# Patient Record
Sex: FEMALE | Race: WHITE | HISPANIC OR LATINO | Employment: STUDENT | URBAN - METROPOLITAN AREA
[De-identification: names, ages, dates, MRNs, and addresses within clinical notes are randomized per-mention and may not be internally consistent; named-entity substitution may affect disease eponyms.]

---

## 2017-01-05 ENCOUNTER — OFFICE VISIT (OUTPATIENT)
Dept: BEHAVIORAL HEALTH | Facility: CLINIC | Age: 22
End: 2017-01-05
Payer: COMMERCIAL

## 2017-01-05 ENCOUNTER — OFFICE VISIT (OUTPATIENT)
Dept: FAMILY MEDICINE | Facility: CLINIC | Age: 22
End: 2017-01-05
Payer: COMMERCIAL

## 2017-01-05 VITALS
SYSTOLIC BLOOD PRESSURE: 110 MMHG | HEART RATE: 82 BPM | DIASTOLIC BLOOD PRESSURE: 77 MMHG | TEMPERATURE: 98.8 F | BODY MASS INDEX: 27.15 KG/M2 | WEIGHT: 173 LBS | HEIGHT: 67 IN

## 2017-01-05 DIAGNOSIS — Z30.8 ENCOUNTER FOR OTHER CONTRACEPTIVE MANAGEMENT: ICD-10-CM

## 2017-01-05 DIAGNOSIS — N76.0 BV (BACTERIAL VAGINOSIS): Primary | ICD-10-CM

## 2017-01-05 DIAGNOSIS — B96.89 BV (BACTERIAL VAGINOSIS): Primary | ICD-10-CM

## 2017-01-05 DIAGNOSIS — Z72.51 UNPROTECTED SEX: ICD-10-CM

## 2017-01-05 DIAGNOSIS — F43.23 ADJUSTMENT DISORDER WITH MIXED ANXIETY AND DEPRESSED MOOD: Primary | ICD-10-CM

## 2017-01-05 DIAGNOSIS — N89.8 VAGINAL DISCHARGE: ICD-10-CM

## 2017-01-05 LAB
MICRO REPORT STATUS: ABNORMAL
SPECIMEN SOURCE: ABNORMAL
WET PREP SPEC: ABNORMAL

## 2017-01-05 PROCEDURE — 99000 SPECIMEN HANDLING OFFICE-LAB: CPT | Performed by: NURSE PRACTITIONER

## 2017-01-05 PROCEDURE — 87491 CHLMYD TRACH DNA AMP PROBE: CPT | Mod: 90 | Performed by: NURSE PRACTITIONER

## 2017-01-05 PROCEDURE — 90832 PSYTX W PT 30 MINUTES: CPT | Performed by: SOCIAL WORKER

## 2017-01-05 PROCEDURE — 87210 SMEAR WET MOUNT SALINE/INK: CPT | Performed by: NURSE PRACTITIONER

## 2017-01-05 PROCEDURE — 87591 N.GONORRHOEAE DNA AMP PROB: CPT | Mod: 90 | Performed by: NURSE PRACTITIONER

## 2017-01-05 PROCEDURE — 99214 OFFICE O/P EST MOD 30 MIN: CPT | Performed by: NURSE PRACTITIONER

## 2017-01-05 RX ORDER — METRONIDAZOLE 500 MG/1
500 TABLET ORAL 2 TIMES DAILY
Qty: 14 TABLET | Refills: 0 | Status: SHIPPED | OUTPATIENT
Start: 2017-01-05 | End: 2017-03-16

## 2017-01-05 RX ORDER — LEVONORGESTREL 1.5 MG/1
1.5 TABLET ORAL ONCE
Qty: 1 TABLET | Refills: 0 | Status: SHIPPED | OUTPATIENT
Start: 2017-01-05 | End: 2017-03-16

## 2017-01-05 RX ORDER — ACETAMINOPHEN AND CODEINE PHOSPHATE 120; 12 MG/5ML; MG/5ML
1 SOLUTION ORAL DAILY
Qty: 84 TABLET | Refills: 3 | Status: SHIPPED | OUTPATIENT
Start: 2017-01-05 | End: 2018-01-08

## 2017-01-05 ASSESSMENT — ANXIETY QUESTIONNAIRES
3. WORRYING TOO MUCH ABOUT DIFFERENT THINGS: SEVERAL DAYS
6. BECOMING EASILY ANNOYED OR IRRITABLE: NEARLY EVERY DAY
IF YOU CHECKED OFF ANY PROBLEMS ON THIS QUESTIONNAIRE, HOW DIFFICULT HAVE THESE PROBLEMS MADE IT FOR YOU TO DO YOUR WORK, TAKE CARE OF THINGS AT HOME, OR GET ALONG WITH OTHER PEOPLE: SOMEWHAT DIFFICULT
1. FEELING NERVOUS, ANXIOUS, OR ON EDGE: SEVERAL DAYS
7. FEELING AFRAID AS IF SOMETHING AWFUL MIGHT HAPPEN: SEVERAL DAYS
2. NOT BEING ABLE TO STOP OR CONTROL WORRYING: SEVERAL DAYS
GAD7 TOTAL SCORE: 11
5. BEING SO RESTLESS THAT IT IS HARD TO SIT STILL: MORE THAN HALF THE DAYS

## 2017-01-05 ASSESSMENT — PATIENT HEALTH QUESTIONNAIRE - PHQ9: 5. POOR APPETITE OR OVEREATING: MORE THAN HALF THE DAYS

## 2017-01-05 NOTE — NURSING NOTE
"Chief Complaint   Patient presents with     Vaginal Problem     yeast infection     Contraception     discuss emergency contraception       Initial /77 mmHg  Pulse 82  Temp(Src) 98.8  F (37.1  C) (Oral)  Ht 5' 6.5\" (1.689 m)  Wt 173 lb (78.472 kg)  BMI 27.51 kg/m2 Estimated body mass index is 27.51 kg/(m^2) as calculated from the following:    Height as of this encounter: 5' 6.5\" (1.689 m).    Weight as of this encounter: 173 lb (78.472 kg).  BP completed using cuff size: regular  "

## 2017-01-05 NOTE — PROGRESS NOTES
"  SUBJECTIVE:                                                    Anny Lisa is a 21 year old female who presents to clinic today for the following health issues:    Chief Complaint   Patient presents with     Vaginal Problem     yeast infection     Contraception- discuss     emergency contraception  Had unprotected intercourse.         Vaginal Symptoms     Onset:  Ongoing issue since August;     Treated for BV in the past    States it turned into a yeast infection    Treated herself with three day OTC medication for yeast which did not relieve symptoms    Description:  Vaginal Discharge: white   Itching (Pruritis): YES  Burning sensation:  YES  Odor: YES    Accompanying Signs & Symptoms:  Pain with Urination: no   Abdominal Pain: no   Fever: no    History:   Sexually active: YES  New Partner: YES  Possibility of Pregnancy:  Yes    Precipitating factors:   Recent Antibiotic Use: no     Alleviating factors:  none     Therapies Tried and outcome: otc yeast treatment, no relief            Problem list and histories reviewed & adjusted, as indicated.  Additional history: as documented    Problem list, Medication list, Allergies, and Medical/Social/Surgical histories reviewed in [facility] and updated as appropriate.    ROS:  Constitutional, HEENT, cardiovascular, pulmonary, gi and gu systems are negative, except as otherwise noted.    OBJECTIVE:                                                    /77 mmHg  Pulse 82  Temp(Src) 98.8  F (37.1  C) (Oral)  Ht 5' 6.5\" (1.689 m)  Wt 173 lb (78.472 kg)  BMI 27.51 kg/m2  Body mass index is 27.51 kg/(m^2).  GENERAL: healthy, alert and no distress   (female): normal female external genitalia, normal urethral meatus, vaginal mucosa, normal cervix/adnexa/uterus without masses or discharge    Diagnostic Test Results:  Results for orders placed or performed in visit on 01/05/17 (from the past 24 hour(s))   Wet prep   Result Value Ref Range    Specimen Description Vagina     " Wet Prep (A)      No yeast seen  Clue cells seen Moderate  No Trichomonas seen      Micro Report Status FINAL 01/05/2017         ASSESSMENT/PLAN:                                                        ICD-10-CM    1. BV (bacterial vaginosis) N76.0 metroNIDAZOLE (FLAGYL) 500 MG tablet    B96.89    2. Encounter for other contraceptive management Z30.8 norethindrone (MICRONOR) 0.35 MG per tablet   3. Unprotected sex Z72.51 levonorgestrel (PLAN B) 1.5 MG TABS tablet   4. Vaginal discharge N89.8 Wet prep     NEISSERIA GONORRHOEA PCR     CHLAMYDIA TRACHOMATIS PCR     Patient was teary today - seemed sad.  Didn't want to share details with me.  Willing to talk to counselor today - warm hand off to our [facility].      The risks, benefits and treatment options of prescribed medications or other treatments have been discussed with the patient. The patient verbalized their understanding and should call or follow up if no improvement or if they develop further problems.  JAMIE Sandra [facility]
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
## 2017-01-06 LAB
C TRACH DNA SPEC QL NAA+PROBE: NORMAL
N GONORRHOEA DNA SPEC QL NAA+PROBE: NORMAL
SPECIMEN SOURCE: NORMAL
SPECIMEN SOURCE: NORMAL

## 2017-01-06 ASSESSMENT — PATIENT HEALTH QUESTIONNAIRE - PHQ9: SUM OF ALL RESPONSES TO PHQ QUESTIONS 1-9: 7

## 2017-01-06 ASSESSMENT — ANXIETY QUESTIONNAIRES: GAD7 TOTAL SCORE: 11

## 2017-01-11 ENCOUNTER — APPOINTMENT (OUTPATIENT)
Dept: ULTRASOUND IMAGING | Facility: CLINIC | Age: 22
End: 2017-01-11
Attending: EMERGENCY MEDICINE
Payer: COMMERCIAL

## 2017-01-11 ENCOUNTER — HOSPITAL ENCOUNTER (EMERGENCY)
Facility: CLINIC | Age: 22
Discharge: HOME | End: 2017-01-11
Attending: EMERGENCY MEDICINE | Admitting: EMERGENCY MEDICINE
Payer: COMMERCIAL

## 2017-01-11 VITALS
TEMPERATURE: 98.2 F | SYSTOLIC BLOOD PRESSURE: 128 MMHG | OXYGEN SATURATION: 97 % | HEIGHT: 67 IN | BODY MASS INDEX: 27.15 KG/M2 | HEART RATE: 74 BPM | RESPIRATION RATE: 18 BRPM | WEIGHT: 173 LBS | DIASTOLIC BLOOD PRESSURE: 79 MMHG

## 2017-01-11 DIAGNOSIS — M79.662 PAIN OF LEFT LOWER LEG: ICD-10-CM

## 2017-01-11 DIAGNOSIS — D68.51 FACTOR 5 LEIDEN MUTATION, HETEROZYGOUS (H): ICD-10-CM

## 2017-01-11 LAB
ALBUMIN SERPL-MCNC: 3.8 G/DL (ref 3.4–5)
ALP SERPL-CCNC: 56 U/L (ref 40–150)
ALT SERPL W P-5'-P-CCNC: 27 U/L (ref 0–50)
ANION GAP SERPL CALCULATED.3IONS-SCNC: 4 MMOL/L (ref 3–14)
AST SERPL W P-5'-P-CCNC: 16 U/L (ref 0–45)
BASOPHILS # BLD AUTO: 0 10E9/L (ref 0–0.2)
BASOPHILS NFR BLD AUTO: 0.6 %
BILIRUB SERPL-MCNC: 0.4 MG/DL (ref 0.2–1.3)
BUN SERPL-MCNC: 11 MG/DL (ref 7–30)
CALCIUM SERPL-MCNC: 8.5 MG/DL (ref 8.5–10.1)
CHLORIDE SERPL-SCNC: 109 MMOL/L (ref 94–109)
CO2 SERPL-SCNC: 29 MMOL/L (ref 20–32)
CREAT SERPL-MCNC: 0.81 MG/DL (ref 0.52–1.04)
D DIMER PPP FEU-MCNC: NORMAL UG/ML FEU (ref 0–0.5)
DIFFERENTIAL METHOD BLD: NORMAL
EOSINOPHIL # BLD AUTO: 0.2 10E9/L (ref 0–0.7)
EOSINOPHIL NFR BLD AUTO: 2.5 %
ERYTHROCYTE [DISTWIDTH] IN BLOOD BY AUTOMATED COUNT: 12.6 % (ref 10–15)
GFR SERPL CREATININE-BSD FRML MDRD: 87 ML/MIN/1.7M2
GLUCOSE SERPL-MCNC: 78 MG/DL (ref 70–99)
HCT VFR BLD AUTO: 43.8 % (ref 35–47)
HGB BLD-MCNC: 14.8 G/DL (ref 11.7–15.7)
IMM GRANULOCYTES # BLD: 0 10E9/L (ref 0–0.4)
IMM GRANULOCYTES NFR BLD: 0.2 %
LYMPHOCYTES # BLD AUTO: 1.9 10E9/L (ref 0.8–5.3)
LYMPHOCYTES NFR BLD AUTO: 30.7 %
MCH RBC QN AUTO: 30.8 PG (ref 26.5–33)
MCHC RBC AUTO-ENTMCNC: 33.8 G/DL (ref 31.5–36.5)
MCV RBC AUTO: 91 FL (ref 78–100)
MONOCYTES # BLD AUTO: 0.6 10E9/L (ref 0–1.3)
MONOCYTES NFR BLD AUTO: 9.9 %
NEUTROPHILS # BLD AUTO: 3.5 10E9/L (ref 1.6–8.3)
NEUTROPHILS NFR BLD AUTO: 56.1 %
PLATELET # BLD AUTO: 241 10E9/L (ref 150–450)
POTASSIUM SERPL-SCNC: 4.2 MMOL/L (ref 3.4–5.3)
PROT SERPL-MCNC: 7.3 G/DL (ref 6.8–8.8)
RBC # BLD AUTO: 4.81 10E12/L (ref 3.8–5.2)
SODIUM SERPL-SCNC: 142 MMOL/L (ref 133–144)
WBC # BLD AUTO: 6.3 10E9/L (ref 4–11)

## 2017-01-11 PROCEDURE — 85025 COMPLETE CBC W/AUTO DIFF WBC: CPT | Performed by: EMERGENCY MEDICINE

## 2017-01-11 PROCEDURE — 99285 EMERGENCY DEPT VISIT HI MDM: CPT | Mod: 25

## 2017-01-11 PROCEDURE — 93005 ELECTROCARDIOGRAM TRACING: CPT

## 2017-01-11 PROCEDURE — 85379 FIBRIN DEGRADATION QUANT: CPT | Performed by: EMERGENCY MEDICINE

## 2017-01-11 PROCEDURE — 99284 EMERGENCY DEPT VISIT MOD MDM: CPT | Performed by: EMERGENCY MEDICINE

## 2017-01-11 PROCEDURE — 93971 EXTREMITY STUDY: CPT | Mod: LT

## 2017-01-11 PROCEDURE — 80053 COMPREHEN METABOLIC PANEL: CPT | Performed by: EMERGENCY MEDICINE

## 2017-01-11 ASSESSMENT — ENCOUNTER SYMPTOMS
ENDOCRINE NEGATIVE: 1
CONSTITUTIONAL NEGATIVE: 1
CARDIOVASCULAR NEGATIVE: 1
EYES NEGATIVE: 1
GASTROINTESTINAL NEGATIVE: 1
PSYCHIATRIC NEGATIVE: 1
RESPIRATORY NEGATIVE: 1
HEMATOLOGIC/LYMPHATIC NEGATIVE: 1

## 2017-01-11 NOTE — ED AVS SNAPSHOT
[facility] Emergency Department    [address] [address]    Phone:  [phone]    Fax:  [phone]                                       Anny Lisa   MRN: [id number]    Department:  [facility] Emergency Department   Date of Visit:  1/11/2017           Patient Information     Date Of Birth          1995        Your diagnoses for this visit were:     Pain of left lower leg overlying popliteal fossa    Factor 5 Leiden mutation, heterozygous (H)        You were seen by Trent Mars MD.      Follow-up Information     Follow up with Roxy Ordoñez APRN CNP. Schedule an appointment as soon as possible for a visit in 3 days.    Specialty:  Nurse Practitioner - Adult Health    Why:  As needed, If symptoms worsen    Contact information:    [facility]  [address] [address]  [phone]        Discharge References/Attachments     SYMPTOMS WITH UNCERTAIN CAUSE (ENGLISH)      24 Hour Appointment Hotline       To make an appointment at any [facility]3], call [phone] ([phone]). If you don't have a family doctor or clinic, we will help you find one. [facility] clinics are conveniently located to serve the needs of you and your family.             Review of your medicines      Our records show that you are taking the medicines listed below. If these are incorrect, please call your family doctor or clinic.        Dose / Directions Last dose taken    ALEVE PO   Dose:  220 mg        Take 220 mg by mouth daily as needed for moderate pain   Refills:  0        levonorgestrel 1.5 MG Tabs tablet   Commonly known as:  PLAN B   Dose:  1.5 mg   Quantity:  1 tablet        Take 1 tablet (1.5 mg) by mouth once for 1 dose   Refills:  0        metroNIDAZOLE 500 MG tablet   Commonly known as:  FLAGYL   Dose:  500 mg   Quantity:  14 tablet        Take 1 tablet (500 mg) by mouth 2 times daily   Refills:  0        norethindrone 0.35 MG per  tablet   Commonly known as:  MICRONOR   Dose:  1 tablet   Quantity:  84 tablet        Take 1 tablet (0.35 mg) by mouth daily   Refills:  3                Procedures and tests performed during your visit     CBC with platelets differential    Comprehensive metabolic panel    D dimer quantitative    EKG 12-lead, tracing only    US Lower Extremity Venous Duplex Left      Orders Needing Specimen Collection     None      Pending Results     Date and Time Order Name Status Description    1/11/2017 1414 US Lower Extremity Venous Duplex Left Preliminary             Pending Culture Results     No orders found from 1/10/2017 to 1/12/2017.       Test Results from your hospital stay           1/11/2017  1:20 PM - Interface, Flexilab Results      Component Results     Component Value Ref Range & Units Status    WBC 6.3 4.0 - 11.0 10e9/L Final    RBC Count 4.81 3.8 - 5.2 10e12/L Final    Hemoglobin 14.8 11.7 - 15.7 g/dL Final    Hematocrit 43.8 35.0 - 47.0 % Final    MCV 91 78 - 100 fl Final    MCH 30.8 26.5 - 33.0 pg Final    MCHC 33.8 31.5 - 36.5 g/dL Final    RDW 12.6 10.0 - 15.0 % Final    Platelet Count 241 150 - 450 10e9/L Final    Diff Method Automated Method  Final    % Neutrophils 56.1 % Final    % Lymphocytes 30.7 % Final    % Monocytes 9.9 % Final    % Eosinophils 2.5 % Final    % Basophils 0.6 % Final    % Immature Granulocytes 0.2 % Final    Absolute Neutrophil 3.5 1.6 - 8.3 10e9/L Final    Absolute Lymphocytes 1.9 0.8 - 5.3 10e9/L Final    Absolute Monocytes 0.6 0.0 - 1.3 10e9/L Final    Absolute Eosinophils 0.2 0.0 - 0.7 10e9/L Final    Absolute Basophils 0.0 0.0 - 0.2 10e9/L Final    Abs Immature Granulocytes 0.0 0 - 0.4 10e9/L Final         1/11/2017  1:34 PM - Interface, Flexilab Results      Component Results     Component Value Ref Range & Units Status    Sodium 142 133 - 144 mmol/L Final    Potassium 4.2 3.4 - 5.3 mmol/L Final    Chloride 109 94 - 109 mmol/L Final    Carbon Dioxide 29 20 - 32 mmol/L Final     Anion Gap 4 3 - 14 mmol/L Final    Glucose 78 70 - 99 mg/dL Final    Urea Nitrogen 11 7 - 30 mg/dL Final    Creatinine 0.81 0.52 - 1.04 mg/dL Final    GFR Estimate 87 >60 mL/min/1.7m2 Final    Non  GFR Calc    GFR Estimate If Black >90   GFR Calc   >60 mL/min/1.7m2 Final    Calcium 8.5 8.5 - 10.1 mg/dL Final    Bilirubin Total 0.4 0.2 - 1.3 mg/dL Final    Albumin 3.8 3.4 - 5.0 g/dL Final    Protein Total 7.3 6.8 - 8.8 g/dL Final    Alkaline Phosphatase 56 40 - 150 U/L Final    ALT 27 0 - 50 U/L Final    AST 16 0 - 45 U/L Final         1/11/2017  3:13 PM - Interface, Radiant Ib      Narrative     VENOUS ULTRASOUND LEFT LOWER EXTREMITY  1/11/2017 2:58 PM     HISTORY: History of Factor-V Leiden, left popliteal pain. History of  deep vein thrombosis 8 years ago. Evaluate for deep vein thrombosis.      COMPARISON: 4/15/2011.    FINDINGS: Examination of the deep veins with graded compression and  color flow Doppler with spectral wave form analysis shows no evidence  of thrombus in the common femoral vein, femoral vein, popliteal vein  or calf veins.  There is no venous insufficiency.        Impression     IMPRESSION: No evidence of deep venous thrombosis.         1/11/2017  2:43 PM - Interface, Flexilab Results      Component Results     Component Value Ref Range & Units Status    D Dimer  0.0 - 0.50 ug/ml FEU Final    <0.3  This D-dimer assay is intended for use in conjuntion with a clinical pretest   probability assessment model to exclude pulmonary embolism (PE) and as an aid   in the diagnosis of deep venous thrombosis (DVT) in outpatients suspected of PE   or DVT. The cut-off value is 0.5 g/mL FEU.                  Thank you for choosing [facility]       Thank you for choosing [facility] for your care. Our goal is always to provide you with excellent care. Hearing back from our patients is one way we can continue to improve our services. Please take a few minutes to complete the written  "survey that you may receive in the mail after you visit with us. Thank you!        [employer] Information     [employer] lets you send messages to your doctor, view your test results, renew your prescriptions, schedule appointments and more. To sign up, go to [email or web address] . Click on \"Log in\" on the left side of the screen, which will take you to the Welcome page. Then click on \"Sign up Now\" on the right side of the page.     You will be asked to enter the access code listed below, as well as some personal information. Please follow the directions to create your username and password.     Your access code is: [email or web address]  Expires: 2017  3:34 PM     Your access code will  in 90 days. If you need help or a new code, please call your [facility] clinic or [phone].        Care EveryWhere ID     This is your Care EveryWhere ID. This could be used by other organizations to access your [facility] medical records  [id number]        After Visit Summary       This is your record. Keep this with you and show to your community pharmacist(s) and doctor(s) at your next visit.                  "

## 2017-01-11 NOTE — ED AVS SNAPSHOT
[facility] Emergency Department    [address] [address]    Phone:  [phone]    Fax:  [phone]                                       Anny Lisa   MRN: [id number]    Department:  [facility] Emergency Department   Date of Visit:  1/11/2017           After Visit Summary Signature Page     I have received my discharge instructions, and my questions have been answered. I have discussed any challenges I see with this plan with the nurse or doctor.    ..........................................................................................................................................  Patient/Patient Representative Signature      ..........................................................................................................................................  Patient Representative Print Name and Relationship to Patient    ..................................................               ................................................  Date                                            Time    ..........................................................................................................................................  Reviewed by Signature/Title    ...................................................              ..............................................  Date                                                            Time

## 2017-01-11 NOTE — ED PROVIDER NOTES
"  History     Chief Complaint   Patient presents with     Chest Pain     left calf pain for 2 days, this am woke with chest pains, hx of factor 5 , and blood clots     Leg Pain     HPI  Ms. Anny Lisa is a 21 year old female with history of factor V leiden w/ DVT 8 years ago while on oral contraception and depression who presents to the ED today for evaluation of chest pain and leg pain. The patient reports gradual onset of left popiteal pain on 1/8/16 worsening over the last two days. She describes the pain as achy, dull localized behind her left knee without radiation. She reports taking aleve last night before bed. She states waking up this morning to have a cigarette when she began to have chest pain, shortness of breath, and persisting left leg pain and decided to come to ED. She is concerned for recurrent blot clot after left leg clot 8 years ago in the setting of estrogen birth control patch. She still takes oral contraception of mostly progesterone. She denies recent fall. She denies surgery to leg. She estimates smoking roughly 8 cigarettes per day. She reports allergy to estrogen.     I have reviewed the Medications, Allergies, Past Medical and Surgical History, and Social History in the Epic system.    Social History: The patient presents via private car from [place], [place]. She came here today because, \"this is where I always come\".    Past Medical History:  Past Medical History   Diagnosis Date     DVT (deep venous thrombosis) (H)      Factor V Leiden mutation carrier     Depression      Labial cyst 2/2012       Medications:  Current Outpatient Prescriptions   Medication Sig Dispense Refill     Naproxen Sodium (ALEVE PO) Take 220 mg by mouth daily as needed for moderate pain       norethindrone (MICRONOR) 0.35 MG per tablet Take 1 tablet (0.35 mg) by mouth daily 84 tablet 3     levonorgestrel (PLAN B) 1.5 MG TABS tablet Take 1 tablet (1.5 mg) by mouth once for 1 dose 1 tablet 0     metroNIDAZOLE " "(FLAGYL) 500 MG tablet Take 1 tablet (500 mg) by mouth 2 times daily 14 tablet 0       Allergies:  Allergies   Allergen Reactions     Estrogen [Estrogenic Substance]      Multiple Blood clots.      Review of Systems   Constitutional: Negative.    HENT: Negative.    Eyes: Negative.    Respiratory: Negative.    Cardiovascular: Negative.    Gastrointestinal: Negative.    Endocrine: Negative.    Genitourinary: Negative.    Musculoskeletal:        Left popliteal fossa pain and discomfort.   Skin: Negative.    Hematological: Negative.    Psychiatric/Behavioral: Negative.        Physical Exam   BP: (!) 142/95 mmHg  Heart Rate: 84  Temp: 98.2  F (36.8  C)  Resp: 16  Height: 170.2 cm (5' 7\")  Weight: 78.472 kg (173 lb)  SpO2: 100 %  Physical Exam   Constitutional: She is oriented to person, place, and time. She appears well-developed and well-nourished. No distress.   HENT:   Head: Normocephalic and atraumatic.   Eyes: Conjunctivae and EOM are normal. Pupils are equal, round, and reactive to light. Right eye exhibits no discharge. Left eye exhibits no discharge. No scleral icterus.   Neck: Normal range of motion. Neck supple. No JVD present. No tracheal deviation present. No thyromegaly present.   Cardiovascular: Normal rate and regular rhythm.    Pulmonary/Chest: Effort normal and breath sounds normal. No stridor. No respiratory distress. She has no wheezes. She has no rales. She exhibits no tenderness.   Abdominal: Soft. Bowel sounds are normal. She exhibits no distension and no mass. There is no tenderness. There is no rebound and no guarding.   Musculoskeletal:        Left upper leg: She exhibits no tenderness (pain ), no bony tenderness, no swelling, no edema and no deformity.        Legs:  Lymphadenopathy:     She has no cervical adenopathy.   Neurological: She is alert and oriented to person, place, and time. No cranial nerve deficit. Coordination normal.   Skin: No rash noted. She is not diaphoretic. No erythema. No " "pallor.   Psychiatric: She has a normal mood and affect. Her behavior is normal. Judgment and thought content normal.       ED Course   Procedures             Critical Care time:  none                 ED Medications: none      ED Vitals:  Filed Vitals:    01/11/17 1146 01/11/17 1339   BP: 142/95 122/67   Temp: 98.2  F (36.8  C)    TempSrc: Oral    Resp: 16 16   Height: 1.702 m (5' 7\")    Weight: 78.472 kg (173 lb)    SpO2: 100%        ED Labs and Imaging:  Results for orders placed or performed during the hospital encounter of 01/11/17 (from the past 24 hour(s))   CBC with platelets differential   Result Value Ref Range    WBC 6.3 4.0 - 11.0 10e9/L    RBC Count 4.81 3.8 - 5.2 10e12/L    Hemoglobin 14.8 11.7 - 15.7 g/dL    Hematocrit 43.8 35.0 - 47.0 %    MCV 91 78 - 100 fl    MCH 30.8 26.5 - 33.0 pg    MCHC 33.8 31.5 - 36.5 g/dL    RDW 12.6 10.0 - 15.0 %    Platelet Count 241 150 - 450 10e9/L    Diff Method Automated Method     % Neutrophils 56.1 %    % Lymphocytes 30.7 %    % Monocytes 9.9 %    % Eosinophils 2.5 %    % Basophils 0.6 %    % Immature Granulocytes 0.2 %    Absolute Neutrophil 3.5 1.6 - 8.3 10e9/L    Absolute Lymphocytes 1.9 0.8 - 5.3 10e9/L    Absolute Monocytes 0.6 0.0 - 1.3 10e9/L    Absolute Eosinophils 0.2 0.0 - 0.7 10e9/L    Absolute Basophils 0.0 0.0 - 0.2 10e9/L    Abs Immature Granulocytes 0.0 0 - 0.4 10e9/L   Comprehensive metabolic panel   Result Value Ref Range    Sodium 142 133 - 144 mmol/L    Potassium 4.2 3.4 - 5.3 mmol/L    Chloride 109 94 - 109 mmol/L    Carbon Dioxide 29 20 - 32 mmol/L    Anion Gap 4 3 - 14 mmol/L    Glucose 78 70 - 99 mg/dL    Urea Nitrogen 11 7 - 30 mg/dL    Creatinine 0.81 0.52 - 1.04 mg/dL    GFR Estimate 87 >60 mL/min/1.7m2    GFR Estimate If Black >90   GFR Calc   >60 mL/min/1.7m2    Calcium 8.5 8.5 - 10.1 mg/dL    Bilirubin Total 0.4 0.2 - 1.3 mg/dL    Albumin 3.8 3.4 - 5.0 g/dL    Protein Total 7.3 6.8 - 8.8 g/dL    Alkaline Phosphatase 56 40 - " 150 U/L    ALT 27 0 - 50 U/L    AST 16 0 - 45 U/L   D dimer quantitative   Result Value Ref Range    D Dimer  0.0 - 0.50 ug/ml FEU     <0.3  This D-dimer assay is intended for use in conjuntion with a clinical pretest   probability assessment model to exclude pulmonary embolism (PE) and as an aid   in the diagnosis of deep venous thrombosis (DVT) in outpatients suspected of PE   or DVT. The cut-off value is 0.5 g/mL FEU.     US Lower Extremity Venous Duplex Left    Narrative    VENOUS ULTRASOUND LEFT LOWER EXTREMITY  1/11/2017 2:58 PM     HISTORY: History of Factor-V Leiden, left popliteal pain. History of  deep vein thrombosis 8 years ago. Evaluate for deep vein thrombosis.      COMPARISON: 4/15/2011.    FINDINGS: Examination of the deep veins with graded compression and  color flow Doppler with spectral wave form analysis shows no evidence  of thrombus in the common femoral vein, femoral vein, popliteal vein  or calf veins.  There is no venous insufficiency.      Impression    IMPRESSION: No evidence of deep venous thrombosis.           Assessments & Plan (with Medical Decision Making)   Clinical Impression:  21-year-old female who presents for concern for lower extremity DVT.  Patient reports she was diagnosed factor V Leiden and had a left lower leg DVT 8 years ago.  She is currently on progesterone oral contraception.  She does continue to smoke 8-10 cigarettes per day.  She developed pain and discomfort over her left popliteal area.  She has no calf pain and no leg swelling.  She does not report any fall or trauma.  Because of progressive pain and discomfort intermittently she arrives in the emergency department to exclude a DVT in her left lower leg.  She had mild chest discomfort while smoking this morning and reports she does have intermittent chest pain and discomfort occasionally.  She does not report any hemoptysis.  No unintentional weight loss.  No tingling or numbness in her foot and no extremity  weakness.  On my exam she has no swelling in the popliteal fossa.  There is no calf swelling and no leg swelling.  Symmetric lower extremity strength.  She is hemodynamically normal.  With normal cardiac and lung exam.     ED Course and Plan:    she has normal labs which were obtained by nursing before my exam and evaluation.  With a long discussion about appropriate diagnostic workup given her underlying history of factor V Leiden, current birth control use with progesterone only components, and history of left lower leg DVT.  She was frustrated she had waited about 2 and a half hours to see a provider.      I apologize for the prolonged wait.  An ultrasound of the left leg is negative for DVT.  She has a normal d-dimer.  With a normal d-dimer negative ultrasound I did not feel CT chest PE protocol was medically necessary given her complaint of chest pain and shortness of breath with a normal lung exam.  She is discharged home with pain and discomfort over the left popliteal fossa of unclear cause.    Disclaimer: This note consists of symbols derived from keyboarding, dictation and/or voice recognition software. As a result, there may be errors in the script that have gone undetected. Please consider this when interpreting information found in this chart.      I have reviewed the nursing notes.    I have reviewed the findings, diagnosis, plan and need for follow up with the patient.    New Prescriptions    No medications on file       Final diagnoses:   Pain of left lower leg - overlying popliteal fossa   Factor 5 Leiden mutation, heterozygous (H)   This document serves as a record of the services and decisions personally performed and made by Trent Mars. The HPI was created on his behalf by Toi George, a trained medical scribe. The creation of this document is based on the provider's statements to the medical scribe.    Toi George 1:43 PM January 11, 2017    Provider:      The information in  this document created by the medical scribe for me, accurately reflects the services I personally performed and the decisions made by me. I have reviewed and approved this document for accuracy prior to leaving the patient care area.  Trent Mars, 1:43 PM January 11, 2017 1/11/2017   [facility] EMERGENCY DEPARTMENT      Trent Mars MD  01/11/17 1532

## 2017-01-11 NOTE — ED NOTES
Pt presents to ER w c/o posterior L knee pain off and on x 4 days. This a.m., while smoking, pt noted some L sided CP and SOA which have waxed and waned since that time.  Pt states this is not out of the norm for her.  However, pt has a h/o Factor V Liden and had DVT LLE at 13 years of age.  She has not had any further complications since that time.  Mom is also Factor V Liden and had PE last year.

## 2017-03-16 ENCOUNTER — OFFICE VISIT (OUTPATIENT)
Dept: FAMILY MEDICINE | Facility: CLINIC | Age: 22
End: 2017-03-16
Payer: COMMERCIAL

## 2017-03-16 VITALS
HEART RATE: 74 BPM | TEMPERATURE: 97.2 F | HEIGHT: 67 IN | BODY MASS INDEX: 29.19 KG/M2 | DIASTOLIC BLOOD PRESSURE: 71 MMHG | SYSTOLIC BLOOD PRESSURE: 123 MMHG | WEIGHT: 186 LBS

## 2017-03-16 DIAGNOSIS — B96.89 BV (BACTERIAL VAGINOSIS): Primary | ICD-10-CM

## 2017-03-16 DIAGNOSIS — N89.8 VAGINAL DISCHARGE: ICD-10-CM

## 2017-03-16 DIAGNOSIS — N76.0 BV (BACTERIAL VAGINOSIS): Primary | ICD-10-CM

## 2017-03-16 LAB
MICRO REPORT STATUS: ABNORMAL
SPECIMEN SOURCE: ABNORMAL
WET PREP SPEC: ABNORMAL

## 2017-03-16 PROCEDURE — 87491 CHLMYD TRACH DNA AMP PROBE: CPT | Performed by: NURSE PRACTITIONER

## 2017-03-16 PROCEDURE — 99213 OFFICE O/P EST LOW 20 MIN: CPT | Performed by: NURSE PRACTITIONER

## 2017-03-16 PROCEDURE — 87210 SMEAR WET MOUNT SALINE/INK: CPT | Performed by: NURSE PRACTITIONER

## 2017-03-16 PROCEDURE — 87591 N.GONORRHOEAE DNA AMP PROB: CPT | Performed by: NURSE PRACTITIONER

## 2017-03-16 RX ORDER — METRONIDAZOLE 7.5 MG/G
1 GEL VAGINAL AT BEDTIME
Qty: 70 G | Refills: 11 | Status: SHIPPED | OUTPATIENT
Start: 2017-03-16 | End: 2017-03-21

## 2017-03-16 NOTE — NURSING NOTE
"Chief Complaint   Patient presents with     Vaginal Problem       Initial /71 (BP Location: Right arm, Patient Position: Chair, Cuff Size: Adult Regular)  Pulse 74  Temp 97.2  F (36.2  C) (Tympanic)  Ht 5' 6.5\" (1.689 m)  Wt 186 lb (84.4 kg)  BMI 29.57 kg/m2 Estimated body mass index is 29.57 kg/(m^2) as calculated from the following:    Height as of this encounter: 5' 6.5\" (1.689 m).    Weight as of this encounter: 186 lb (84.4 kg).  Medication Reconciliation: complete    "

## 2017-03-16 NOTE — PATIENT INSTRUCTIONS
Thank you for choosing [facility].  You may be receiving a survey in the mail from Samir Garcia regarding your visit today.  Please take a few minutes to complete and return the survey to let us know how we are doing.      If you have questions or concerns, please contact us via Misoca or you can contact your care team at [phone].    Our Clinic hours are:  Monday 6:40 am  to 7:00 pm  Tuesday -Friday 6:40 am to 5:00 pm    The [place] outpatient lab hours are:  Monday - Friday 6:10 am to 4:45 pm  Saturdays 7:00 am to 11:00 am  Appointments are required, call [phone]    If you have clinical questions after hours or would like to schedule an appointment,  call the clinic at [phone].

## 2017-03-16 NOTE — PROGRESS NOTES
"  SUBJECTIVE:                                                    Anny Lisa is a 21 year old female who presents to clinic today for the following health issues:      Vaginal Symptoms     Onset: Ongoing since last August    Description:  Vaginal Discharge: white clear   Itching (Pruritis): YES- occasional  Burning sensation:  YES  Odor: YES    Accompanying Signs & Symptoms:  Pain with Urination: no   Abdominal Pain: no   Fever: no    History:   Sexually active: YES  New Partner: YES  Possibility of Pregnancy:  No    Precipitating factors:   Recent Antibiotic Use: no     Alleviating factors:  Metronidazole helps   Therapies Tried and outcome: metronidazole helps as long as she does not drink alcohol.        Problem list and histories reviewed & adjusted, as indicated.  Additional history: as documented      Reviewed and updated as needed this visit by clinical staff       Reviewed and updated as needed this visit by Provider         ROS:  Constitutional, HEENT, cardiovascular, pulmonary, gi and gu systems are negative, except as otherwise noted.    OBJECTIVE:                                                    /71 (BP Location: Right arm, Patient Position: Chair, Cuff Size: Adult Regular)  Pulse 74  Temp 97.2  F (36.2  C) (Tympanic)  Ht 5' 6.5\" (1.689 m)  Wt 186 lb (84.4 kg)  BMI 29.57 kg/m2  Body mass index is 29.57 kg/(m^2).  GENERAL: healthy, alert and no distress   (female): normal female external genitalia, normal urethral meatus, vaginal mucosa, normal cervix/adnexa/uterus without masses or discharge    Diagnostic Test Results:  Results for orders placed or performed in visit on 03/16/17 (from the past 24 hour(s))   Wet prep   Result Value Ref Range    Specimen Description Vagina     Wet Prep (A)      No yeast seen  Clue cells seen Few  No Trichomonas seen      Micro Report Status FINAL 03/16/2017         ASSESSMENT/PLAN:                                                        ICD-10-CM    1. BV " (bacterial vaginosis) N76.0 Recurrent BV.  Will treat with Metrogel for 5 nights, and then start suppressive therapy with Metrogel twice weekly for 6 months.    metroNIDAZOLE (METROGEL) 0.75 % vaginal gel    B96.89    2. Vaginal discharge N89.8 Wet prep     NEISSERIA GONORRHOEA PCR     CHLAMYDIA TRACHOMATIS PCR         The risks, benefits and treatment options of prescribed medications or other treatments have been discussed with the patient. The patient verbalized their understanding and should call or follow up if no improvement or if they develop further problems.    JAMIE Sandra [facility]

## 2017-03-16 NOTE — MR AVS SNAPSHOT
After Visit Summary   3/16/2017    Anny Lisa    MRN: [id number]           Patient Information     Date Of Birth          1995        Visit Information        Provider Department      3/16/2017 11:20 AM Faye Stephen APRN CNP [facility]        Today's Diagnoses     BV (bacterial vaginosis)    -  1    Vaginal discharge          Care Instructions          Thank you for choosing [facility].  You may be receiving a survey in the mail from [facility] regarding your visit today.  Please take a few minutes to complete and return the survey to let us know how we are doing.      If you have questions or concerns, please contact us via [employer] or you can contact your care team at [phone].    Our Clinic hours are:  Monday 6:40 am  to 7:00 pm  Tuesday -Friday 6:40 am to 5:00 pm    The [place] outpatient lab hours are:  Monday - Friday 6:10 am to 4:45 pm  Saturdays 7:00 am to 11:00 am  Appointments are required, call [phone]    If you have clinical questions after hours or would like to schedule an appointment,  call the clinic at [phone].          Follow-ups after your visit        Who to contact     If you have questions or need follow up information about today's clinic visit or your schedule please contact [facility] directly at [phone].  Normal or non-critical lab and imaging results will be communicated to you by Insurityhart, letter or phone within 4 business days after the clinic has received the results. If you do not hear from us within 7 days, please contact the clinic through RESAASt or phone. If you have a critical or abnormal lab result, we will notify you by phone as soon as possible.  Submit refill requests through [employer] or call your pharmacy and they will forward the refill request to us. Please allow 3 business days for your refill to be completed.          Additional Information About Your Visit        [facility]  "Information     Compliance Assurance lets you send messages to your doctor, view your test results, renew your prescriptions, schedule appointments and more. To sign up, go to [email or web address] . Click on \"Log in\" on the left side of the screen, which will take you to the Welcome page. Then click on \"Sign up Now\" on the right side of the page.     You will be asked to enter the access code listed below, as well as some personal information. Please follow the directions to create your username and password.     Your access code is: [email or web address]  Expires: 2017  4:34 PM     Your access code will  in 90 days. If you need help or a new code, please call your [facility] clinic or [phone].        Care EveryWhere ID     This is your Care EveryWhere ID. This could be used by other organizations to access your [facility] medical records  [id number]        Your Vitals Were     Pulse Temperature Height BMI (Body Mass Index)          74 97.2  F (36.2  C) (Tympanic) 5' 6.5\" (1.689 m) 29.57 kg/m2         Blood Pressure from Last 3 Encounters:   17 123/71   17 128/79   17 110/77    Weight from Last 3 Encounters:   17 186 lb (84.4 kg)   17 173 lb (78.5 kg)   17 173 lb (78.5 kg)              We Performed the Following     CHLAMYDIA TRACHOMATIS PCR     NEISSERIA GONORRHOEA PCR     Wet prep          Today's Medication Changes          These changes are accurate as of: 3/16/17  1:32 PM.  If you have any questions, ask your nurse or doctor.               Start taking these medicines.        Dose/Directions    metroNIDAZOLE 0.75 % vaginal gel   Commonly known as:  METROGEL   Used for:  BV (bacterial vaginosis)   Started by:  Faye Stephen APRN CNP        Dose:  1 applicator   Place 1 applicator (5 g) vaginally At Bedtime for 5 days Then use twice weekly for 6 months.   Quantity:  70 g   Refills:  11            Where to get your medicines      These medications were sent to " [id number] - [place], [place] - [address]  [address] [address]     Phone:  [phone]     metroNIDAZOLE 0.75 % vaginal gel                Primary Care Provider Office Phone # Fax #    JAMIE Contreras [phone]       [facility] [address] [address]        Thank you!     Thank you for choosing [facility]  for your care. Our goal is always to provide you with excellent care. Hearing back from our patients is one way we can continue to improve our services. Please take a few minutes to complete the written survey that you may receive in the mail after your visit with us. Thank you!             Your Updated Medication List - Protect others around you: Learn how to safely use, store and throw away your medicines at www.disposemymeds.org.          This list is accurate as of: 3/16/17  1:32 PM.  Always use your most recent med list.                   Brand Name Dispense Instructions for use    ALEVE PO      Take 220 mg by mouth daily as needed for moderate pain       metroNIDAZOLE 0.75 % vaginal gel    METROGEL    70 g    Place 1 applicator (5 g) vaginally At Bedtime for 5 days Then use twice weekly for 6 months.       norethindrone 0.35 MG per tablet    MICRONOR    84 tablet    Take 1 tablet (0.35 mg) by mouth daily

## 2017-11-08 ENCOUNTER — TELEPHONE (OUTPATIENT)
Dept: FAMILY MEDICINE | Facility: CLINIC | Age: 22
End: 2017-11-08

## 2017-11-08 NOTE — LETTER
[facility]  [address] [address]  [phone]    November 8, 2017    Anny Lisa  [address] [place] [address]          Dear Anny,    --Pap smear screening is recommended every 3 years Some patients require more frequent Pap smears based on an individual assessment of other risk factors. Please call the clinic to schedule this in the near future. According to our records, your last pap smear was 10/15/14    If you have had this test at an outside facility, please let us know so that we can update your record.     Please disregard this notice if you have already scheduled an appointment.     Sincerely,    Faye GIL

## 2017-11-08 NOTE — TELEPHONE ENCOUNTER
Panel Management Review            Composite cancer screening  Chart review shows that this patient is due/due soon for the following Pap Smear  Summary:    Patient is due/failing the following:   PAP    Action needed:   Patient needs office visit for physical/ pap.    Type of outreach:    Sent letter.    Questions for provider review:    None                                                                                                                                    JUVENTINO MCMAHON\        .

## 2017-12-17 ENCOUNTER — HEALTH MAINTENANCE LETTER (OUTPATIENT)
Age: 22
End: 2017-12-17

## 2018-01-08 DIAGNOSIS — Z30.8 ENCOUNTER FOR OTHER CONTRACEPTIVE MANAGEMENT: ICD-10-CM

## 2018-01-08 NOTE — TELEPHONE ENCOUNTER
"Requested Prescriptions   Pending Prescriptions Disp Refills     AMENA 0.35 MG per tablet [Pharmacy Med Name: AMNEA 0.35 MG TABLET]  Last Written Prescription Date:  01/05/17  Last Fill Quantity: 84,  # refills: 3   Last Office Visit with G, P or [facility] prescribing provider:  03/16/17   Future Office Visit:      84 tablet 2     Sig: TAKE 1 TABLET (0.35 MG) BY MOUTH DAILY    Contraceptives Protocol Passed    1/8/2018  1:23 AM       Passed - Patient is not a current smoker if age is 35 or older       Passed - Recent or future visit with authorizing provider's specialty    Patient had office visit in the last year or has a visit in the next 30 days with authorizing provider.  See \"Choose Columns\" in Meds & Orders section of the refill encounter         Passed - No active pregnancy on record       Passed - No positive pregnancy test in past 12 months          "

## 2018-01-09 NOTE — TELEPHONE ENCOUNTER
Left message for patient to return call to clinic.    Patient due for OV for refill on this medication, LOV for this 1/5/17, last pap 2014    Sunitha MOYA Rn

## 2018-01-10 RX ORDER — ACETAMINOPHEN AND CODEINE PHOSPHATE 120; 12 MG/5ML; MG/5ML
1 SOLUTION ORAL DAILY
Qty: 30 TABLET | Refills: 0 | Status: SHIPPED | OUTPATIENT
Start: 2018-01-10 | End: 2018-04-02

## 2018-02-13 DIAGNOSIS — Z30.8 ENCOUNTER FOR OTHER CONTRACEPTIVE MANAGEMENT: ICD-10-CM

## 2018-02-13 NOTE — TELEPHONE ENCOUNTER
"Requested Prescriptions   Pending Prescriptions Disp Refills     AMENA 0.35 MG per tablet [Pharmacy Med Name: AMENA 0.35 MG TABLET]  Last Written Prescription Date:  01/10/2018  Last Fill Quantity: 30,  # refills: 0   Last office visit: 3/16/2017 with prescribing provider:  Hailee   Future Office Visit:     28 tablet 0     Sig: TAKE 1 TABLET (0.35 MG) BY MOUTH DAILY (NEEDS FOLLOW-UP APPOINTMENT FOR THIS MEDICATION)    Contraceptives Protocol Passed    2/13/2018  1:55 AM       Passed - Patient is not a current smoker if age is 35 or older       Passed - Recent or future visit with authorizing provider's specialty    Patient had office visit in the last year or has a visit in the next 30 days with authorizing provider.  See \"Patient Info\" tab in inbasket, or \"Choose Columns\" in Meds & Orders section of the refill encounter.            Passed - No active pregnancy on record       Passed - No positive pregnancy test in past 12 months        Sd Trejo RT (R)    "

## 2018-02-14 RX ORDER — NORETHINDRONE 0.35 MG/1
TABLET ORAL
Qty: 28 TABLET | Refills: 0 | OUTPATIENT
Start: 2018-02-14

## 2018-02-14 NOTE — TELEPHONE ENCOUNTER
Patient was notified. Patient will  Check with her insurance if [place] is covered.    Louisa DHILLON RN

## 2018-04-02 ENCOUNTER — OFFICE VISIT (OUTPATIENT)
Dept: FAMILY MEDICINE | Facility: CLINIC | Age: 23
End: 2018-04-02
Payer: COMMERCIAL

## 2018-04-02 VITALS
WEIGHT: 196 LBS | DIASTOLIC BLOOD PRESSURE: 78 MMHG | SYSTOLIC BLOOD PRESSURE: 127 MMHG | HEART RATE: 74 BPM | HEIGHT: 67 IN | BODY MASS INDEX: 30.76 KG/M2 | TEMPERATURE: 98.5 F

## 2018-04-02 DIAGNOSIS — J06.9 VIRAL URI: ICD-10-CM

## 2018-04-02 DIAGNOSIS — N89.8 VAGINAL DISCHARGE: ICD-10-CM

## 2018-04-02 DIAGNOSIS — Z01.419 ENCOUNTER FOR GYNECOLOGICAL EXAMINATION WITHOUT ABNORMAL FINDING: Primary | ICD-10-CM

## 2018-04-02 DIAGNOSIS — Z30.8 ENCOUNTER FOR OTHER CONTRACEPTIVE MANAGEMENT: ICD-10-CM

## 2018-04-02 LAB
SPECIMEN SOURCE: NORMAL
WET PREP SPEC: NORMAL

## 2018-04-02 PROCEDURE — 87210 SMEAR WET MOUNT SALINE/INK: CPT | Performed by: NURSE PRACTITIONER

## 2018-04-02 PROCEDURE — 87591 N.GONORRHOEAE DNA AMP PROB: CPT | Performed by: NURSE PRACTITIONER

## 2018-04-02 PROCEDURE — G0145 SCR C/V CYTO,THINLAYER,RESCR: HCPCS | Performed by: NURSE PRACTITIONER

## 2018-04-02 PROCEDURE — 99395 PREV VISIT EST AGE 18-39: CPT | Performed by: NURSE PRACTITIONER

## 2018-04-02 PROCEDURE — 87491 CHLMYD TRACH DNA AMP PROBE: CPT | Performed by: NURSE PRACTITIONER

## 2018-04-02 RX ORDER — ACETAMINOPHEN AND CODEINE PHOSPHATE 120; 12 MG/5ML; MG/5ML
1 SOLUTION ORAL DAILY
Qty: 84 TABLET | Refills: 3 | Status: SHIPPED | OUTPATIENT
Start: 2018-04-02 | End: 2019-03-03

## 2018-04-02 NOTE — LETTER
April 9, 2018      Anny Rivera  [address]  [place] [address]    Dear ,      I am happy to inform you that your recent cervical cancer screening test (PAP smear) was normal.      Preventative screenings such as this help to ensure your health for years to come. You should repeat a pap smear in 3 years, unless otherwise directed.      You will still need to return to the clinic every year for your annual exam and other preventive tests.     Please contact the clinic at [phone] if you have further questions.       Sincerely,      Faye Stephen, JAMIE CNP/rlm

## 2018-04-02 NOTE — PROGRESS NOTES
SUBJECTIVE:   CC: Anny Lisa is an 22 year old woman who presents for preventive health visit.     Healthy Habits:    Do you get at least three servings of calcium containing foods daily (dairy, green leafy vegetables, etc.)? yes    Amount of exercise or daily activities, outside of work: 3-4 day(s) per week    Problems taking medications regularly No    Medication side effects: No    Have you had an eye exam in the past two years? no    Do you see a dentist twice per year? yes    Do you have sleep apnea, excessive snoring or daytime drowsiness?no      URI symptoms-  6 days- worsening  Nasal congestion  Cough with wheezing  Shortness of breath  Hard time breathing  Hard time sleeping.  Ears plugged  No sore throat  No fever  No body aches  Sudafed/mucinex      Today's PHQ-2 Score:   PHQ-2 ( 1999 Pfizer) 4/2/2018 10/18/2016   Q1: Little interest or pleasure in doing things 0 0   Q2: Feeling down, depressed or hopeless 0 0   PHQ-2 Score 0 0       Abuse: Current or Past(Physical, Sexual or Emotional)- No  Do you feel safe in your environment - Yes    Social History   Substance Use Topics     Smoking status: Current Every Day Smoker     Packs/day: 0.50     Years: 2.00     Types: Cigarettes     Smokeless tobacco: Never Used      Comment: 5 cigarettes per day     Alcohol use No     If you drink alcohol do you typically have >3 drinks per day or >7 drinks per week? No                     Reviewed orders with patient.  Reviewed health maintenance and updated orders accordingly - Yes      History of abnormal Pap smear: NO - age 21-29 PAP every 3 years recommended    Reviewed and updated as needed this visit by clinical staff  Tobacco  Allergies  Meds         Reviewed and updated as needed this visit by Provider            ROS:  C: NEGATIVE for fever, chills, change in weight  I: NEGATIVE for worrisome rashes, moles or lesions  E: NEGATIVE for vision changes or irritation  ENT: NEGATIVE for ear, mouth and throat  "problems  R: NEGATIVE for significant cough or SOB  B: NEGATIVE for masses, tenderness or discharge  CV: NEGATIVE for chest pain, palpitations or peripheral edema  GI: NEGATIVE for nausea, abdominal pain, heartburn, or change in bowel habits  : NEGATIVE for unusual urinary or vaginal symptoms. Periods are regular. Patient c/o intermittent white vaginal discharge - no odor. Worried about recurrent BV.  M: NEGATIVE for significant arthralgias or myalgia  N: NEGATIVE for weakness, dizziness or paresthesias  P: NEGATIVE for changes in mood or affect    OBJECTIVE:   /78 (BP Location: Left arm)  Pulse 74  Temp 98.5  F (36.9  C) (Oral)  Ht 5' 7\" (1.702 m)  Wt 196 lb (88.9 kg)  LMP 03/31/2018 (Exact Date)  BMI 30.7 kg/m2  EXAM:  GENERAL: healthy, alert and no distress  EYES: Eyes grossly normal to inspection, PERRL and conjunctivae and sclerae normal  HENT: ear canals and TM's normal, nose and mouth without ulcers or lesions  NECK: no adenopathy, no asymmetry, masses, or scars and thyroid normal to palpation  RESP: lungs clear to auscultation - no rales, rhonchi or wheezes  BREAST: normal without masses, tenderness or nipple discharge and no palpable axillary masses or adenopathy  CV: regular rate and rhythm, normal S1 S2, no S3 or S4, no murmur, click or rub, no peripheral edema and peripheral pulses strong  ABDOMEN: soft, nontender, no hepatosplenomegaly, no masses and bowel sounds normal   (female): normal female external genitalia, normal urethral meatus, vaginal mucosa pink, moist, well rugated, and normal cervix/adnexa/uterus without masses or discharge  MS: no gross musculoskeletal defects noted, no edema  SKIN: no suspicious lesions or rashes  NEURO: Normal strength and tone, mentation intact and speech normal  PSYCH: mentation appears normal, affect normal/bright    Results for orders placed or performed in visit on 04/02/18   Wet prep   Result Value Ref Range    Specimen Description Vagina     Wet " "Prep No yeast seen     Wet Prep No clue cells seen     Wet Prep No Trichomonas seen        ASSESSMENT/PLAN:       ICD-10-CM    1. Encounter for gynecological examination without abnormal finding Z01.419 PAP imaged thin layer screen only - recommended age 21 - 24 years     NEISSERIA GONORRHOEA PCR     CHLAMYDIA TRACHOMATIS PCR     2. Encounter for other contraceptive management   Z30.8 norethindrone (AMENA) 0.35 MG per tablet   3. Viral URI J06.9 Treat symptomatically.  Call in one week if no improvement.    B97.89    4. Vaginal discharge N89.8 Wet prep       COUNSELING:   Reviewed preventive health counseling, as reflected in patient instructions         reports that she has been smoking Cigarettes.  She has a 1.00 pack-year smoking history. She has never used smokeless tobacco.  Tobacco Cessation Action Plan: Information offered: Patient not interested at this time  Estimated body mass index is 30.7 kg/(m^2) as calculated from the following:    Height as of this encounter: 5' 7\" (1.702 m).    Weight as of this encounter: 196 lb (88.9 kg).   Weight management plan: Discussed healthy diet and exercise guidelines and patient will follow up in 12 months in clinic to re-evaluate.      The risks, benefits and treatment options of prescribed medications or other treatments have been discussed with the patient. The patient verbalized their understanding and should call or follow up if no improvement or if they develop further problems.    JAMIE Sandra [facility]  "

## 2018-04-02 NOTE — MR AVS SNAPSHOT
After Visit Summary   4/2/2018    Anny Lisa    MRN: [id number]           Patient Information     Date Of Birth          1995        Visit Information        Provider Department      4/2/2018 9:00 AM Faye Stephen APRN [facility]        Today's Diagnoses     Encounter for gynecological examination without abnormal finding    -  1    Encounter for other contraceptive management        Viral URI        Vaginal discharge          Care Instructions      Preventive Health Recommendations  Female Ages 18 to 25     Yearly exam:     See your health care provider every year in order to  o Review health changes.   o Discuss preventive care.    o Review your medicines if your doctor has prescribed any.      You should be tested each year for STDs (sexually transmitted diseases).       After age 20, talk to your provider about how often you should have cholesterol testing.      Starting at age 21, get a Pap test every three years. If you have an abnormal result, your doctor may have you test more often.      If you are at risk for diabetes, you should have a diabetes test (fasting glucose).     Shots:     Get a flu shot each year.     Get a tetanus shot every 10 years.     Consider getting the shot (vaccine) that prevents cervical cancer (Gardasil).    Nutrition:     Eat at least 5 servings of fruits and vegetables each day.    Eat whole-grain bread, whole-wheat pasta and brown rice instead of white grains and rice.    Talk to your provider about Calcium and Vitamin D.     Lifestyle    Exercise at least 150 minutes a week each week (30 minutes a day, 5 days a week). This will help you control your weight and prevent disease.    Limit alcohol to one drink per day.    No smoking.     Wear sunscreen to prevent skin cancer.    See your dentist every six months for an exam and cleaning.          Follow-ups after your visit        Who to contact     If you have questions or  "need follow up information about today's clinic visit or your schedule please contact [facility] directly at [phone].  Normal or non-critical lab and imaging results will be communicated to you by CommitChangehart, letter or phone within 4 business days after the clinic has received the results. If you do not hear from us within 7 days, please contact the clinic through CommitChangehart or phone. If you have a critical or abnormal lab result, we will notify you by phone as soon as possible.  Submit refill requests through [email or web address] or call your pharmacy and they will forward the refill request to us. Please allow 3 business days for your refill to be completed.          Additional Information About Your Visit        CommitChangeharNovafora Information     [email or web address] lets you send messages to your doctor, view your test results, renew your prescriptions, schedule appointments and more. To sign up, go to [email or web address] . Click on \"Log in\" on the left side of the screen, which will take you to the Welcome page. Then click on \"Sign up Now\" on the right side of the page.     You will be asked to enter the access code listed below, as well as some personal information. Please follow the directions to create your username and password.     Your access code is: [email or web address]  Expires: 2018  9:55 AM     Your access code will  in 90 days. If you need help or a new code, please call your [place] clinic or [phone].        Care EveryWhere ID     This is your Care EveryWhere ID. This could be used by other organizations to access your [place] medical records  [id number]        Your Vitals Were     Pulse Temperature Height Last Period BMI (Body Mass Index)       74 98.5  F (36.9  C) (Oral) 5' 7\" (1.702 m) 2018 (Exact Date) 30.7 kg/m2        Blood Pressure from Last 3 Encounters:   18 127/78   17 123/71   17 128/79    Weight from Last 3 Encounters:   18 196 lb (88.9 kg)   17 186 lb (84.4 " kg)   01/11/17 173 lb (78.5 kg)              We Performed the Following     CHLAMYDIA TRACHOMATIS PCR     NEISSERIA GONORRHOEA PCR     PAP imaged thin layer screen only - recommended age 21 - 24 years     Wet prep          Today's Medication Changes          These changes are accurate as of 4/2/18  9:55 AM.  If you have any questions, ask your nurse or doctor.               These medicines have changed or have updated prescriptions.        Dose/Directions    norethindrone 0.35 MG per tablet   Commonly known as:  AMENA   This may have changed:  additional instructions   Used for:  Encounter for other contraceptive management   Changed by:  Faye Stephen, JAMIE LOBATO        Dose:  1 tablet   Take 1 tablet (0.35 mg) by mouth daily   Quantity:  84 tablet   Refills:  3            Where to get your medicines      These medications were sent to [facility]/pharmacy #[id number] [address] [address]     Phone:  [phone]     norethindrone 0.35 MG per tablet                Primary Care Provider Office Phone # Fax #    Roxy JAMIE Zavala [phone] [address] [facility] [address]        Equal Access to Services     NETTE BURRELL AH: Hadii aad ku hadasho Soomaali, waaxda luqadaha, qaybta kaalmada adeegyada, gaetano cohen . So [facility] [phone].    ATENCIÓN: Si habla español, tiene a german disposición servicios gratuitos de asistencia lingüística. Gerard al [phone].    We comply with applicable federal civil rights laws and [place] laws. We do not discriminate on the basis of race, color, national origin, age, disability, sex, sexual orientation, or gender identity.            Thank you!     Thank you for choosing [facility]  for your care. Our goal is always to provide you with excellent care. Hearing back from our patients is one way we can continue to improve our services. Please  take a few minutes to complete the written survey that you may receive in the mail after your visit with us. Thank you!             Your Updated Medication List - Protect others around you: Learn how to safely use, store and throw away your medicines at www.disposemymeds.org.          This list is accurate as of 4/2/18  9:55 AM.  Always use your most recent med list.                   Brand Name Dispense Instructions for use Diagnosis    ALEVE PO      Take 220 mg by mouth daily as needed for moderate pain        norethindrone 0.35 MG per tablet    AMENA    84 tablet    Take 1 tablet (0.35 mg) by mouth daily    Encounter for other contraceptive management

## 2018-04-02 NOTE — NURSING NOTE
"Chief Complaint   Patient presents with     Physical     Health Maintenance     patient declined tetanus and HPV vaccine.       Initial /78 (BP Location: Left arm)  Pulse 74  Temp 98.5  F (36.9  C) (Oral)  Ht 5' 7\" (1.702 m)  Wt 196 lb (88.9 kg)  LMP 03/31/2018 (Exact Date)  BMI 30.7 kg/m2 Estimated body mass index is 30.7 kg/(m^2) as calculated from the following:    Height as of this encounter: 5' 7\" (1.702 m).    Weight as of this encounter: 196 lb (88.9 kg).  Medication Reconciliation: complete  "

## 2018-04-03 LAB
C TRACH DNA SPEC QL NAA+PROBE: NEGATIVE
N GONORRHOEA DNA SPEC QL NAA+PROBE: NEGATIVE
SPECIMEN SOURCE: NORMAL
SPECIMEN SOURCE: NORMAL

## 2018-04-04 LAB
COPATH REPORT: NORMAL
PAP: NORMAL

## 2018-09-09 ENCOUNTER — E-VISIT (OUTPATIENT)
Dept: FAMILY MEDICINE | Facility: CLINIC | Age: 23
End: 2018-09-09
Payer: COMMERCIAL

## 2018-09-09 DIAGNOSIS — H10.029 PINK EYE DISEASE, UNSPECIFIED LATERALITY: Primary | ICD-10-CM

## 2018-09-09 PROCEDURE — 99444 ZZC PHYSICIAN ONLINE EVALUATION & MANAGEMENT SERVICE: CPT | Performed by: NURSE PRACTITIONER

## 2018-09-10 ENCOUNTER — MYC MEDICAL ADVICE (OUTPATIENT)
Dept: FAMILY MEDICINE | Facility: CLINIC | Age: 23
End: 2018-09-10

## 2018-09-10 DIAGNOSIS — H10.029 PINK EYE DISEASE, UNSPECIFIED LATERALITY: ICD-10-CM

## 2018-09-10 RX ORDER — POLYMYXIN B SULFATE AND TRIMETHOPRIM 1; 10000 MG/ML; [USP'U]/ML
1 SOLUTION OPHTHALMIC
Qty: 1 BOTTLE | Refills: 0 | Status: SHIPPED | OUTPATIENT
Start: 2018-09-10 | End: 2018-10-01

## 2018-09-10 RX ORDER — POLYMYXIN B SULFATE AND TRIMETHOPRIM 1; 10000 MG/ML; [USP'U]/ML
1 SOLUTION OPHTHALMIC
Qty: 1 BOTTLE | Refills: 0 | Status: SHIPPED | OUTPATIENT
Start: 2018-09-10 | End: 2018-09-10

## 2018-09-10 NOTE — TELEPHONE ENCOUNTER
"Pt requested through [employer] that Rx be sent to [facility] (was sent to [facility] in [place]).  Called [facility] and requested they can cancel the Rx, \"ok we'll put it back.\"    Liliana CHAUHAN RN      "

## 2018-09-13 NOTE — PATIENT INSTRUCTIONS
Bacterial Conjunctivitis    You have an infection in the membranes covering the white part of the eye. This part of the eye is called the conjunctiva. The infection is called conjunctivitis. The most common symptoms of conjunctivitis include a thick, pus-like discharge from the eye, swollen eyelids, redness, eyelids sticking together upon awakening, and a gritty or scratchy feeling in the eye. Your infection was caused by bacteria. It may be treated with medicine. With treatment, the infection takes about 7 to 10 days to resolve.  Home care    Use prescribed antibiotic eye drops or ointment as directed to treat the infection.    Apply a warm compress (towel soaked in warm water) to the affected eye 3 to 4 times a day. Do this just before applying medicine to the eye.    Use a warm, wet cloth to wipe away crusting of the eyelids in the morning. This is caused by mucus drainage during the night. You may also use saline irrigating solution or artificial tears to rinse away mucus in the eye. Do not put a patch over the eye.    Wash your hands before and after touching the infected eye. This is to prevent spreading the infection to the other eye, and to other people. Don't share your towels or washcloths with others.    You may use acetaminophen or ibuprofen to control pain, unless another medicine was prescribed. (Note: If you have chronic liver or kidney disease or have ever had a stomach ulcer or gastrointestinal bleeding, talk with your doctor before using these medicines.)    Don't wear contact lenses until your eyes have healed and all symptoms are gone.  Follow-up care  Follow up with your healthcare provider, or as advised.  When to seek medical advice  Call your healthcare provider right away if any of these occur:    Worsening vision    Increasing pain in the eye    Increasing swelling or redness of the eyelid    Redness spreading around the eye  Date Last Reviewed: 7/1/2017 2000-2017 [employer],  [facility]. [address] [place] [address]. All rights reserved. This information is not intended as a substitute for professional medical care. Always follow your healthcare professional's instructions.

## 2018-09-28 NOTE — PROGRESS NOTES
SUBJECTIVE:   Anny Lisa is a 23 year old female who presents to clinic today for the following health issues:      Vaginal Symptoms  Onset: 2 years    Description:  Vaginal Discharge: intermittent, white   Itching (Pruritis): YES- outside vaginal opening  Burning sensation:  YES-intermittent  Odor: no     Accompanying Signs & Symptoms:  Pain with Urination: no   Abdominal Pain: no   Fever: no     History:   Sexually active: YES  New Partner: no   Possibility of Pregnancy:  No    Precipitating factors:   Recent Antibiotic Use: no     Alleviating factors:  nothing    Therapies Tried and outcome: MetroGel, Flagyl, Diflucan    Per patient, has been having vulvar symptoms intermittently since her first diagnosis of BV in 2016. Symptoms on and off, but many times seem to be present mildly. Describes slight burning/tingling in the same area at the vaginal opening. Discharge varies. Occasionally has an itch. Not related to cycles, intercourse.   Has been treated in the past for BV and yeast. Has eliminated caffeine, sugary foods. Takes probiotics regularly. When symptoms really flaring, can have mild discomfort with penetration. No irregular bleeding. Uses condoms most of the time. Same partner for a while. No STI concerns. Screening in the past negative. Uses all unscented products.     Problem list and histories reviewed & adjusted, as indicated.  Additional history: as documented    Patient Active Problem List   Diagnosis     DVT     Factor V Leiden mutation (H)     Smoker     Generalized anxiety disorder     Tobacco use disorder     Past Surgical History:   Procedure Laterality Date     NO HISTORY OF SURGERY         Social History   Substance Use Topics     Smoking status: Current Every Day Smoker     Packs/day: 0.50     Years: 2.00     Types: Cigarettes     Smokeless tobacco: Never Used      Comment: 5 cigarettes per day     Alcohol use No     Family History   Problem Relation Age of Onset     Breast Cancer  Maternal Grandmother      Cancer Maternal Grandmother      lung     Cancer Maternal Grandfather      Cancer - colorectal Paternal Grandmother      Blood Disease Mother      mother with Factor V Leiden deficiency     Pulmonary Embolism Mother 51           Reviewed and updated as needed this visit by clinical staff       Reviewed and updated as needed this visit by Provider         ROS:  Constitutional, HEENT, cardiovascular, pulmonary, gi and gu systems are negative, except as otherwise noted.    OBJECTIVE:     /64  Pulse 65  Wt 193 lb 3.2 oz (87.6 kg)  LMP 09/12/2018  SpO2 97%  Breastfeeding? No  BMI 30.26 kg/m2  Body mass index is 30.26 kg/(m^2).  GENERAL: healthy, alert and no distress  RESP: lungs clear to auscultation - no rales, rhonchi or wheezes  CV: regular rate and rhythm, normal S1 S2, no S3 or S4, no murmur, click or rub, no peripheral edema and peripheral pulses strong  ABDOMEN: soft, nontender, no hepatosplenomegaly, no masses and bowel sounds normal   (female): normal female external genitalia except mild erythema around introitus, normal urethral meatus, vaginal mucosa, normal cervix/adnexa/uterus without masses or discharge. No discomfort with palpation around introitus.  MS: no gross musculoskeletal defects noted, no edema  SKIN: no suspicious lesions or rashes  PSYCH: mentation appears normal, affect normal/bright    Diagnostic Test Results:  Results for orders placed or performed in visit on 10/01/18 (from the past 24 hour(s))   Wet prep   Result Value Ref Range    Specimen Description Vagina     Wet Prep No Trichomonas seen     Wet Prep No clue cells seen     Wet Prep No yeast seen        ASSESSMENT/PLAN:   1. Vulvar irritation  We discussed her intermittent symptoms. Today, no sign of infection but we discussed external vulvar symptoms. Trial of topical ointment. Discussed use, additional home care relief measures. No intercourse x 2 weeks. Return to clinic if symptoms persist or  change.  - Wet prep  - nystatin-triamcinolone (MYCOLOG) ointment; Apply topically 2 times daily  Dispense: 30 g; Refill: 0    JAMIE Dang CNP  [facility]

## 2018-10-01 ENCOUNTER — OFFICE VISIT (OUTPATIENT)
Dept: OBGYN | Facility: CLINIC | Age: 23
End: 2018-10-01
Payer: COMMERCIAL

## 2018-10-01 VITALS
DIASTOLIC BLOOD PRESSURE: 64 MMHG | BODY MASS INDEX: 30.26 KG/M2 | SYSTOLIC BLOOD PRESSURE: 106 MMHG | HEART RATE: 65 BPM | WEIGHT: 193.2 LBS | OXYGEN SATURATION: 97 %

## 2018-10-01 DIAGNOSIS — N90.89 VULVAR IRRITATION: Primary | ICD-10-CM

## 2018-10-01 LAB
SPECIMEN SOURCE: NORMAL
WET PREP SPEC: NORMAL

## 2018-10-01 PROCEDURE — 87210 SMEAR WET MOUNT SALINE/INK: CPT | Performed by: NURSE PRACTITIONER

## 2018-10-01 PROCEDURE — 99213 OFFICE O/P EST LOW 20 MIN: CPT | Performed by: NURSE PRACTITIONER

## 2018-10-01 RX ORDER — NYSTATIN AND TRIAMCINOLONE ACETONIDE 100000; 1 [USP'U]/G; MG/G
OINTMENT TOPICAL 2 TIMES DAILY
Qty: 30 G | Refills: 0 | Status: SHIPPED | OUTPATIENT
Start: 2018-10-01 | End: 2018-10-26

## 2018-10-01 NOTE — MR AVS SNAPSHOT
After Visit Summary   10/1/2018    Anny Lisa    MRN: [id number]           Patient Information     Date Of Birth          1995        Visit Information        Provider Department      10/1/2018 7:50 AM Kim Swanson APRN CNP [facility]        Today's Diagnoses     Vulvar irritation    -  1       Follow-ups after your visit        Who to contact     If you have questions or need follow up information about today's clinic visit or your schedule please contact [facility] directly at [phone].  Normal or non-critical lab and imaging results will be communicated to you by Kromekhart, letter or phone within 4 business days after the clinic has received the results. If you do not hear from us within 7 days, please contact the clinic through [employer] or phone. If you have a critical or abnormal lab result, we will notify you by phone as soon as possible.  Submit refill requests through [employer] or call your pharmacy and they will forward the refill request to us. Please allow 3 business days for your refill to be completed.          Additional Information About Your Visit        MyChart Information     [employer] gives you secure access to your electronic health record. If you see a primary care provider, you can also send messages to your care team and make appointments. If you have questions, please call your primary care clinic.  If you do not have a primary care provider, please call [phone] and they will assist you.        Care EveryWhere ID     This is your Care EveryWhere ID. This could be used by other organizations to access your [place] medical records  [id number]        Your Vitals Were     Pulse Last Period Pulse Oximetry Breastfeeding? BMI (Body Mass Index)       65 09/12/2018 97% No 30.26 kg/m2        Blood Pressure from Last 3 Encounters:   10/01/18 106/64   04/02/18 127/78   03/16/17 123/71    Weight from Last 3 Encounters:   10/01/18 193 lb  3.2 oz (87.6 kg)   04/02/18 196 lb (88.9 kg)   03/16/17 186 lb (84.4 kg)              We Performed the Following     Wet prep          Today's Medication Changes          These changes are accurate as of 10/1/18  9:40 AM.  If you have any questions, ask your nurse or doctor.               Start taking these medicines.        Dose/Directions    nystatin-triamcinolone ointment   Commonly known as:  MYCOLOG   Used for:  Vulvar irritation   Started by:  Kim Swanson APRN CNP        Apply topically 2 times daily   Quantity:  30 g   Refills:  0         Stop taking these medicines if you haven't already. Please contact your care team if you have questions.     trimethoprim-polymyxin b ophthalmic solution   Commonly known as:  POLYTRIM   Stopped by:  Kim Swanson APRN CNP                Where to get your medicines      These medications were sent to [facility]/pharmacy #[id number] [address] [address]     Phone:  [phone]     nystatin-triamcinolone ointment                Primary Care Provider Office Phone # Fax #    Faye Smith JAMIE Hinds [phone] [address] [facility] [address]        Equal Access to Services     NETTE BURRELL AH: Hadii colleen ku hadasho Soomaali, waaxda luqadaha, qaybta kaalmada adeegyada, waxay scottin hayghazala amos. So [facility] [phone].    ATENCIÓN: Si habla español, tiene a german disposición servicios gratuitos de asistencia lingüística. Llame al [phone].    We comply with applicable federal civil rights laws and [place] laws. We do not discriminate on the basis of race, color, national origin, age, disability, sex, sexual orientation, or gender identity.            Thank you!     Thank you for choosing [facility]  for your care. Our goal is always to provide you with excellent care. Hearing back from our patients is one way we can continue to improve our  services. Please take a few minutes to complete the written survey that you may receive in the mail after your visit with us. Thank you!             Your Updated Medication List - Protect others around you: Learn how to safely use, store and throw away your medicines at www.disposemymeds.org.          This list is accurate as of 10/1/18  9:40 AM.  Always use your most recent med list.                   Brand Name Dispense Instructions for use Diagnosis    ALEVE PO      Take 220 mg by mouth daily as needed for moderate pain        norethindrone 0.35 MG per tablet    AMENA    84 tablet    Take 1 tablet (0.35 mg) by mouth daily    Encounter for other contraceptive management       nystatin-triamcinolone ointment    MYCOLOG    30 g    Apply topically 2 times daily    Vulvar irritation

## 2018-10-24 NOTE — PROGRESS NOTES
"  SUBJECTIVE:   Anny Lisa is a 23 year old female who presents to clinic today for the following health issues:    Follow up vulvar irritation    After her last visit, used Mycolog and within 3 days, all her external irritation and itching resolved. Did 2 weeks of treatment and the irritation has improved, but having thick, white discharge and vaginal burning. Symptoms of burning only internal. No new partner and declines STI screening. Same progesterone only oral contraceptive pill for the last 5+ years. No changes with products, no bubble baths. Does not use condoms. Denies abnormal urinary symptoms, fevers, pelvic pain.    Problem list and histories reviewed & adjusted, as indicated.  Additional history: as documented    Patient Active Problem List   Diagnosis     DVT     Factor V Leiden mutation (H)     Smoker     Generalized anxiety disorder     Tobacco use disorder     Past Surgical History:   Procedure Laterality Date     NO HISTORY OF SURGERY         Social History   Substance Use Topics     Smoking status: Current Every Day Smoker     Packs/day: 0.25     Years: 2.00     Types: Cigarettes     Smokeless tobacco: Never Used     Alcohol use No     Family History   Problem Relation Age of Onset     Breast Cancer Maternal Grandmother      Cancer Maternal Grandmother      lung     Cancer Maternal Grandfather      Cancer - colorectal Paternal Grandmother      Blood Disease Mother      mother with Factor V Leiden deficiency     Pulmonary Embolism Mother 51           Reviewed and updated as needed this visit by clinical staff       Reviewed and updated as needed this visit by Provider         ROS:  Constitutional, HEENT, cardiovascular, pulmonary, gi and gu systems are negative, except as otherwise noted.    OBJECTIVE:     /74  Pulse 63  Temp 97.8  F (36.6  C) (Oral)  Ht 5' 7\" (1.702 m)  Wt 193 lb (87.5 kg)  LMP 10/09/2018 (Exact Date)  SpO2 98%  BMI 30.23 kg/m2  Body mass index is 30.23 " kg/(m^2).  GENERAL: healthy, alert and no distress  ABDOMEN: soft, nontender, no hepatosplenomegaly, no masses and bowel sounds normal   (female): normal female external genitalia, normal urethral meatus , vaginal mucosa pink, moist, well rugated, vaginal discharge - moderate, white and thick and normal cervix, adnexae, and uterus without masses.  MS: no gross musculoskeletal defects noted, no edema  SKIN: no suspicious lesions or rashes  PSYCH: mentation appears normal, affect normal/bright    Diagnostic Test Results:  Results for orders placed or performed in visit on 10/26/18 (from the past 24 hour(s))   Wet prep   Result Value Ref Range    Specimen Description Vagina     Wet Prep No Trichomonas seen     Wet Prep Clue cells seen (A)     Wet Prep No yeast seen        ASSESSMENT/PLAN:   1. Vulvar irritation  - Wet prep    2. Bacterial vaginosis  Discussed her concerns regarding recurrent vaginal and vulvar symptoms at length. Feels MetroGel always causes yeast symptoms and PO Flagyl not effective. Will try Cleocin vaginally at bedtime x 7. Home care measures to try discussed, return to clinic PRN.  - clindamycin (CLEOCIN) 2 % cream; Place 1 applicator vaginally At Bedtime for 7 days  Dispense: 40 g; Refill: 1    JAMIE Dang [facility]

## 2018-10-26 ENCOUNTER — OFFICE VISIT (OUTPATIENT)
Dept: OBGYN | Facility: CLINIC | Age: 23
End: 2018-10-26
Payer: COMMERCIAL

## 2018-10-26 VITALS
BODY MASS INDEX: 30.29 KG/M2 | TEMPERATURE: 97.8 F | HEIGHT: 67 IN | SYSTOLIC BLOOD PRESSURE: 122 MMHG | WEIGHT: 193 LBS | OXYGEN SATURATION: 98 % | HEART RATE: 63 BPM | DIASTOLIC BLOOD PRESSURE: 74 MMHG

## 2018-10-26 DIAGNOSIS — N76.0 BACTERIAL VAGINOSIS: ICD-10-CM

## 2018-10-26 DIAGNOSIS — N90.89 VULVAR IRRITATION: Primary | ICD-10-CM

## 2018-10-26 DIAGNOSIS — B96.89 BACTERIAL VAGINOSIS: ICD-10-CM

## 2018-10-26 LAB
SPECIMEN SOURCE: ABNORMAL
WET PREP SPEC: ABNORMAL

## 2018-10-26 PROCEDURE — 87210 SMEAR WET MOUNT SALINE/INK: CPT | Performed by: NURSE PRACTITIONER

## 2018-10-26 PROCEDURE — 99213 OFFICE O/P EST LOW 20 MIN: CPT | Performed by: NURSE PRACTITIONER

## 2018-10-26 RX ORDER — CLINDAMYCIN PHOSPHATE 20 MG/G
1 CREAM VAGINAL AT BEDTIME
Qty: 40 G | Refills: 1 | Status: SHIPPED | OUTPATIENT
Start: 2018-10-26 | End: 2018-11-02

## 2018-10-26 ASSESSMENT — PAIN SCALES - GENERAL: PAINLEVEL: NO PAIN (0)

## 2018-10-26 NOTE — NURSING NOTE
"Chief Complaint   Patient presents with     Follow Up For     Vulvar irritation       Initial /74  Pulse 63  Temp 97.8  F (36.6  C) (Oral)  Ht 5' 7\" (1.702 m)  Wt 193 lb (87.5 kg)  LMP 10/09/2018 (Exact Date)  SpO2 98%  BMI 30.23 kg/m2 Estimated body mass index is 30.23 kg/(m^2) as calculated from the following:    Height as of this encounter: 5' 7\" (1.702 m).    Weight as of this encounter: 193 lb (87.5 kg)..  BP completed using cuff size: ayesha Rausch CMA    "

## 2018-10-26 NOTE — MR AVS SNAPSHOT
"              After Visit Summary   10/26/2018    Anny Lisa    MRN: [id number]           Patient Information     Date Of Birth          1995        Visit Information        Provider Department      10/26/2018 11:10 AM Kim Swanson APRN CNP [facility]        Today's Diagnoses     Vulvar irritation    -  1    Bacterial vaginosis           Follow-ups after your visit        Who to contact     If you have questions or need follow up information about today's clinic visit or your schedule please contact [facility] directly at [phone].  Normal or non-critical lab and imaging results will be communicated to you by [employer], letter or phone within 4 business days after the clinic has received the results. If you do not hear from us within 7 days, please contact the clinic through [employer] or phone. If you have a critical or abnormal lab result, we will notify you by phone as soon as possible.  Submit refill requests through [facility] or call your pharmacy and they will forward the refill request to us. Please allow 3 business days for your refill to be completed.          Additional Information About Your Visit        MyChart Information     [facility] gives you secure access to your electronic health record. If you see a primary care provider, you can also send messages to your care team and make appointments. If you have questions, please call your primary care clinic.  If you do not have a primary care provider, please call [phone] and they will assist you.        Care EveryWhere ID     This is your Care EveryWhere ID. This could be used by other organizations to access your [place] medical records  [id number]        Your Vitals Were     Pulse Temperature Height Last Period Pulse Oximetry BMI (Body Mass Index)    63 97.8  F (36.6  C) (Oral) 5' 7\" (1.702 m) 10/09/2018 (Exact Date) 98% 30.23 kg/m2       Blood Pressure from Last 3 Encounters:   10/26/18 122/74 "   10/01/18 106/64   04/02/18 127/78    Weight from Last 3 Encounters:   10/26/18 193 lb (87.5 kg)   10/01/18 193 lb 3.2 oz (87.6 kg)   04/02/18 196 lb (88.9 kg)              We Performed the Following     Wet prep          Today's Medication Changes          These changes are accurate as of 10/26/18 11:48 AM.  If you have any questions, ask your nurse or doctor.               Start taking these medicines.        Dose/Directions    clindamycin 2 % cream   Commonly known as:  CLEOCIN   Used for:  Bacterial vaginosis   Started by:  Kim Swanson APRN CNP        Dose:  1 applicator   Place 1 applicator vaginally At Bedtime for 7 days   Quantity:  40 g   Refills:  1         Stop taking these medicines if you haven't already. Please contact your care team if you have questions.     nystatin-triamcinolone ointment   Commonly known as:  MYCOLOG   Stopped by:  Kim Swanson APRN CNP                Where to get your medicines      These medications were sent to [facility]/pharmacy #[id number] [place] [address] [address] [address]     Phone:  [phone]     clindamycin 2 % cream                Primary Care Provider Office Phone # Fax #    Faye Smith JAMIE Hinds [phone] [address] [facility] [address]        Equal Access to Services     [facility]: Hadii colleen wiley hadasho Somichaelali, waaxda luqadaha, qaybta kaalmada adeegyada, gaetano cohen . So [facility] [phone].    ATENCIÓN: Si habla español, tiene a german disposición servicios gratuitos de asistencia lingüística. Gerard al [phone].    We comply with applicable federal civil rights laws and [place] laws. We do not discriminate on the basis of race, color, national origin, age, disability, sex, sexual orientation, or gender identity.            Thank you!     Thank you for choosing [facility]  [facility]. Our goal is always to provide  you with excellent care. Hearing back from our patients is one way we can continue to improve our services. Please take a few minutes to complete the written survey that you may receive in the mail after your visit with us. Thank you!             Your Updated Medication List - Protect others around you: Learn how to safely use, store and throw away your medicines at www.disposemymeds.org.          This list is accurate as of 10/26/18 11:48 AM.  Always use your most recent med list.                   Brand Name Dispense Instructions for use Diagnosis    ALEVE PO      Take 220 mg by mouth daily as needed for moderate pain        clindamycin 2 % cream    CLEOCIN    40 g    Place 1 applicator vaginally At Bedtime for 7 days    Bacterial vaginosis       norethindrone 0.35 MG per tablet    AMENA    84 tablet    Take 1 tablet (0.35 mg) by mouth daily    Encounter for other contraceptive management

## 2018-12-06 DIAGNOSIS — Z30.8 ENCOUNTER FOR OTHER CONTRACEPTIVE MANAGEMENT: ICD-10-CM

## 2018-12-06 RX ORDER — ACETAMINOPHEN AND CODEINE PHOSPHATE 120; 12 MG/5ML; MG/5ML
0.35 SOLUTION ORAL DAILY
Qty: 84 TABLET | Refills: 3 | Status: CANCELLED | OUTPATIENT
Start: 2018-12-06

## 2018-12-06 NOTE — TELEPHONE ENCOUNTER
"Requested Prescriptions   Pending Prescriptions Disp Refills     norethindrone (AMENA) 0.35 MG tablet 84 tablet 3    Last Written Prescription Date:  12/3/18  Last Fill Quantity: 84,  # refills: 3   Last office visit: 4/2/2018 with prescribing provider:  Hailee   Future Office Visit:     Sig: Take 1 tablet (0.35 mg) by mouth daily    Contraceptives Protocol Passed    12/6/2018  3:47 PM       Passed - Patient is not a current smoker if age is 35 or older       Passed - Recent (12 mo) or future (30 days) visit within the authorizing provider's specialty    Patient had office visit in the last 12 months or has a visit in the next 30 days with authorizing provider or within the authorizing provider's specialty.  See \"Patient Info\" tab in inbasket, or \"Choose Columns\" in Meds & Orders section of the refill encounter.             Passed - No active pregnancy on record       Passed - No positive pregnancy test in past 12 months          "

## 2019-03-03 DIAGNOSIS — Z30.8 ENCOUNTER FOR OTHER CONTRACEPTIVE MANAGEMENT: ICD-10-CM

## 2019-03-04 NOTE — TELEPHONE ENCOUNTER
"Requested Prescriptions   Pending Prescriptions Disp Refills     AMENA 0.35 MG tablet [Pharmacy Med Name: AMENA 0.35 MG TABLET] 84 tablet 3    Last Written Prescription Date:  4/2/18  Last Fill Quantity: 84 tab,  # refills: 3   Last office visit: 4/2/2018 with prescribing provider:  Faye Stephen     Future Office Visit:     Sig: TAKE 1 TABLET (0.35 MG) BY MOUTH DAILY    Contraceptives Protocol Passed - 3/3/2019  8:41 AM       Passed - Patient is not a current smoker if age is 35 or older       Passed - Recent (12 mo) or future (30 days) visit within the authorizing provider's specialty    Patient had office visit in the last 12 months or has a visit in the next 30 days with authorizing provider or within the authorizing provider's specialty.  See \"Patient Info\" tab in inbasket, or \"Choose Columns\" in Meds & Orders section of the refill encounter.             Passed - Medication is active on med list       Passed - No active pregnancy on record       Passed - No positive pregnancy test in past 12 months        Needs office visit for further refills  "

## 2019-03-05 RX ORDER — NORETHINDRONE 0.35 MG/1
TABLET ORAL
Qty: 84 TABLET | Refills: 0 | Status: SHIPPED | OUTPATIENT
Start: 2019-03-05 | End: 2019-05-22

## 2019-05-22 DIAGNOSIS — Z30.8 ENCOUNTER FOR OTHER CONTRACEPTIVE MANAGEMENT: ICD-10-CM

## 2019-05-22 NOTE — TELEPHONE ENCOUNTER
I have attempted to contact this patient by phone with the following results: left message to return my call on answering machine. Needs a follow-up appointment for this request.    Vivienne Nicole RN

## 2019-05-22 NOTE — TELEPHONE ENCOUNTER
"Requested Prescriptions   Pending Prescriptions Disp Refills     AMENA 0.35 MG tablet [Pharmacy Med Name: AMENA 0.35 MG TABLET] 84 tablet 0     Sig: TAKE 1 TABLET (0.35 MG) BY MOUTH DAILY       Contraceptives Protocol Failed - 5/22/2019  7:35 AM        Failed - Recent (12 mo) or future (30 days) visit within the authorizing provider's specialty     Patient had office visit in the last 12 months or has a visit in the next 30 days with authorizing provider or within the authorizing provider's specialty.  See \"Patient Info\" tab in inbasket, or \"Choose Columns\" in Meds & Orders section of the refill encounter.              Passed - Patient is not a current smoker if age is 35 or older        Passed - Medication is active on med list        Passed - No active pregnancy on record        Passed - No positive pregnancy test in past 12 months        Last Written Prescription Date:  3/5/19  Last Fill Quantity: 84,  # refills: 0   Last office visit: 4/2/2018 with prescribing provider:  Hailee   Future Office Visit:      "

## 2019-05-23 RX ORDER — NORETHINDRONE 0.35 MG/1
TABLET ORAL
Qty: 28 TABLET | Refills: 0 | Status: SHIPPED | OUTPATIENT
Start: 2019-05-23 | End: 2019-08-20

## 2019-05-23 NOTE — TELEPHONE ENCOUNTER
I spoke with pt.  Will refill x 1 month.  Pt has new insurance and does not have her card with her. Pt will call back to schedule her appt. Before her prescription is out.    Medication is being filled for 1 time refill only due to:  Patient needs to be seen because it has been more than one year since last visit.     Sherly Martinez RN

## 2019-06-25 ENCOUNTER — TELEPHONE (OUTPATIENT)
Dept: FAMILY MEDICINE | Facility: CLINIC | Age: 24
End: 2019-06-25

## 2019-06-25 DIAGNOSIS — Z30.8 ENCOUNTER FOR OTHER CONTRACEPTIVE MANAGEMENT: ICD-10-CM

## 2019-06-25 NOTE — TELEPHONE ENCOUNTER
"Requested Prescriptions   Pending Prescriptions Disp Refills     AMENA 0.35 MG tablet [Pharmacy Med Name: AMENA 0.35 MG TABLET] 28 tablet 0     Sig: TAKE 1 TABLET (0.35 MG) BY MOUTH DAILY   Last Written Prescription Date:  5/23/19  Last Fill Quantity: 28 tab,  # refills: 0   Last office visit: 4/2/2018 with prescribing provider:  Faye Stephen     Future Office Visit:        Contraceptives Protocol Failed - 6/25/2019  1:37 AM        Failed - Recent (12 mo) or future (30 days) visit within the authorizing provider's specialty     Patient had office visit in the last 12 months or has a visit in the next 30 days with authorizing provider or within the authorizing provider's specialty.  See \"Patient Info\" tab in inbasket, or \"Choose Columns\" in Meds & Orders section of the refill encounter.              Passed - Patient is not a current smoker if age is 35 or older        Passed - Medication is active on med list        Passed - No active pregnancy on record        Passed - No positive pregnancy test in past 12 months          "

## 2019-06-26 NOTE — TELEPHONE ENCOUNTER
Left message on answering machine for patient to call back.    Has not been seen in FP 4/2/18.  Has been seen in OB in [place],    Thank you    Louisa DHILLON RN

## 2019-06-27 RX ORDER — NORETHINDRONE 0.35 MG/1
TABLET ORAL
Qty: 28 TABLET | Refills: 0 | OUTPATIENT
Start: 2019-06-27

## 2019-07-16 DIAGNOSIS — Z30.8 ENCOUNTER FOR OTHER CONTRACEPTIVE MANAGEMENT: ICD-10-CM

## 2019-07-16 NOTE — TELEPHONE ENCOUNTER
"Requested Prescriptions   Pending Prescriptions Disp Refills     AMENA 0.35 MG tablet [Pharmacy Med Name: AMENA 0.35 MG TABLET] 28 tablet 0     Sig: TAKE 1 TABLET (0.35 MG) BY MOUTH DAILY   Last Written Prescription Date:  5/23/19  Last Fill Quantity: 28 tab,  # refills: 0   Last office visit: 4/2/2018 with prescribing provider:  Faye Stephen     Future Office Visit:        Contraceptives Protocol Failed - 7/16/2019 10:27 AM        Failed - Recent (12 mo) or future (30 days) visit within the authorizing provider's specialty     Patient had office visit in the last 12 months or has a visit in the next 30 days with authorizing provider or within the authorizing provider's specialty.  See \"Patient Info\" tab in inbasket, or \"Choose Columns\" in Meds & Orders section of the refill encounter.              Passed - Patient is not a current smoker if age is 35 or older        Passed - Medication is active on med list        Passed - No active pregnancy on record        Passed - No positive pregnancy test in past 12 months          "

## 2019-07-17 RX ORDER — NORETHINDRONE 0.35 MG/1
TABLET ORAL
Qty: 28 TABLET | Refills: 0 | OUTPATIENT
Start: 2019-07-17

## 2019-08-20 DIAGNOSIS — Z30.8 ENCOUNTER FOR OTHER CONTRACEPTIVE MANAGEMENT: ICD-10-CM

## 2019-08-20 NOTE — TELEPHONE ENCOUNTER
"Requested Prescriptions   Pending Prescriptions Disp Refills     NORLYDA 0.35 MG tablet [Pharmacy Med Name: NORLYDA 0.35MG TABLETS 28S] 28 tablet 0     Sig: TAKE 1 TABLET BY MOUTH EVERY DAY       Contraceptives Protocol Failed - 8/20/2019  3:37 PM        Failed - Recent (12 mo) or future (30 days) visit within the authorizing provider's specialty     Patient had office visit in the last 12 months or has a visit in the next 30 days with authorizing provider or within the authorizing provider's specialty.  See \"Patient Info\" tab in inbasket, or \"Choose Columns\" in Meds & Orders section of the refill encounter.              Passed - Patient is not a current smoker if age is 35 or older        Passed - Medication is active on med list        Passed - No active pregnancy on record        Passed - No positive pregnancy test in past 12 months        Last Written Prescription Date:  5/23/2019  Last Fill Quantity: 28,  # refills: 0   Last office visit: 4/2/2018 with prescribing provider:  kenji   Future Office Visit:      "

## 2019-08-21 RX ORDER — NORETHINDRONE
KIT
Qty: 28 TABLET | Refills: 0 | Status: SHIPPED | OUTPATIENT
Start: 2019-08-21 | End: 2022-09-07

## 2019-08-21 NOTE — TELEPHONE ENCOUNTER
Routing refill request to provider for review/approval because:  Meghan given x1.  Patient needs to be seen because:  Overdue for office visit for medication review and management.    AMIE Gutierrez

## 2020-04-21 DIAGNOSIS — Z30.8 ENCOUNTER FOR OTHER CONTRACEPTIVE MANAGEMENT: ICD-10-CM

## 2020-04-22 NOTE — TELEPHONE ENCOUNTER
It doesn't appear that she has been taking this medication consistently.    She will need an office visit and pregnancy test to restart contraception.  Faye Stephen, CNP

## 2020-04-23 RX ORDER — NORETHINDRONE
KIT
Qty: 28 TABLET | Refills: 0 | OUTPATIENT
Start: 2020-04-23

## 2020-04-23 NOTE — TELEPHONE ENCOUNTER
I left a message for the patient to return my call.  Last ordered 8/21/19 for #28 with 0 R.  Has she been getting prescribed somewhere else?     Liliana CHAUHAN RN, BSN

## 2020-10-14 LAB
ALT SERPL-CCNC: 13 IU/L (ref 8–45)
AST SERPL-CCNC: 20 IU/L (ref 2–40)
CREATININE (EXTERNAL): 0.9 MG/DL (ref 0.57–1.11)
GFR ESTIMATED (EXTERNAL): >60 ML/MIN/1.73M2
GFR ESTIMATED (IF AFRICAN AMERICAN) (EXTERNAL): >60 ML/MIN/1.73M2
GLUCOSE (EXTERNAL): 88 MG/DL (ref 65–100)
POTASSIUM (EXTERNAL): 4 MMOL/L (ref 3.5–5)
TSH SERPL-ACNC: 0.96 UIU/ML (ref 0.35–4.94)

## 2021-01-19 ENCOUNTER — TRANSFERRED RECORDS (OUTPATIENT)
Dept: HEALTH INFORMATION MANAGEMENT | Facility: CLINIC | Age: 26
End: 2021-01-19

## 2021-01-19 LAB — PAP-ABSTRACT: NORMAL

## 2021-10-21 NOTE — TELEPHONE ENCOUNTER
The patient called back and said this was a mistake that she seeing [facility] now.     Elda Gutierrez on 4/23/2020 at 4:27 PM     unresponsive for 2 min as per wife

## 2022-09-07 ENCOUNTER — OFFICE VISIT (OUTPATIENT)
Dept: FAMILY MEDICINE | Facility: CLINIC | Age: 27
End: 2022-09-07
Payer: COMMERCIAL

## 2022-09-07 VITALS
SYSTOLIC BLOOD PRESSURE: 120 MMHG | DIASTOLIC BLOOD PRESSURE: 80 MMHG | BODY MASS INDEX: 32.02 KG/M2 | HEIGHT: 67 IN | HEART RATE: 79 BPM | WEIGHT: 204 LBS

## 2022-09-07 DIAGNOSIS — L67.8 ABNORMAL FACIAL HAIR: ICD-10-CM

## 2022-09-07 DIAGNOSIS — Z00.00 WELL ADULT EXAM: Primary | ICD-10-CM

## 2022-09-07 DIAGNOSIS — D68.51 FACTOR V LEIDEN MUTATION (H): ICD-10-CM

## 2022-09-07 PROBLEM — B00.9 HERPES SIMPLEX TYPE 2 INFECTION: Status: ACTIVE | Noted: 2019-06-10

## 2022-09-07 PROCEDURE — 99385 PREV VISIT NEW AGE 18-39: CPT | Performed by: FAMILY MEDICINE

## 2022-09-07 PROCEDURE — 99203 OFFICE O/P NEW LOW 30 MIN: CPT | Mod: 25 | Performed by: FAMILY MEDICINE

## 2022-09-07 RX ORDER — EFLORNITHINE HYDROCHLORIDE 139 MG/G
CREAM TOPICAL 2 TIMES DAILY
Qty: 45 G | Refills: 3 | Status: SHIPPED | OUTPATIENT
Start: 2022-09-07 | End: 2023-10-30

## 2022-09-07 RX ORDER — VALACYCLOVIR HYDROCHLORIDE 1 G/1
1 TABLET, FILM COATED ORAL DAILY
COMMUNITY
Start: 2021-01-19

## 2022-09-07 RX ORDER — PRENATAL VIT/IRON FUM/FOLIC AC 27MG-0.8MG
1 TABLET ORAL DAILY
COMMUNITY
End: 2024-02-02

## 2022-09-07 ASSESSMENT — ENCOUNTER SYMPTOMS
CHILLS: 0
SORE THROAT: 0
COUGH: 0
BREAST MASS: 0
NAUSEA: 0
JOINT SWELLING: 0
SHORTNESS OF BREATH: 1
HEMATURIA: 0
DIARRHEA: 0
MYALGIAS: 0
DIZZINESS: 0
NERVOUS/ANXIOUS: 0
ABDOMINAL PAIN: 0
PARESTHESIAS: 0
PALPITATIONS: 0
HEMATOCHEZIA: 0
CONSTIPATION: 0
EYE PAIN: 0
HEARTBURN: 0
HEADACHES: 0
FEVER: 0
FREQUENCY: 0
DYSURIA: 0
ARTHRALGIAS: 0
WEAKNESS: 0

## 2022-09-07 NOTE — PROGRESS NOTES
SUBJECTIVE:   CC: Anny Lisa is an 27 year old woman who presents for preventive health visit.     Patient has been advised of split billing requirements and indicates understanding: Yes  Healthy Habits:     Getting at least 3 servings of Calcium per day:  Yes    Bi-annual eye exam:  Yes    Dental care twice a year:  Yes    Sleep apnea or symptoms of sleep apnea:  Daytime drowsiness    Diet:  Regular (no restrictions)    Frequency of exercise:  2-3 days/week    Duration of exercise:  30-45 minutes    Taking medications regularly:  Yes    Medication side effects:  Not applicable    PHQ-2 Total Score: 2    Additional concerns today:  Yes      Patient is here for annual wellness visit.  She previously followed at [facility] but has a new job.  She has discontinued the use of birth control pills.  She is currently hoping to get pregnant but has not been following it regularly.  Her period is due within the next week.  Periods have been regular since discontinuing birth control 3 months ago.  She does have prenatal vitamins at home.  I have instructed her to start taking them every day now.  She notes that she is generally been healthy.  Past medical history is reviewed.  She has a history of HSV-2 and takes valacyclovir daily.  She is tolerating this well.  If she does have a positive pregnancy test she will discontinue it until she meets with her OB/GYN.  Given her history of thrombophilia, she will need immediate follow-up with OB/GYN if she becomes pregnant to discuss use of anticoagulation through pregnancy.  Her only concern is significant hair on her chin.  She notes that she has to pluck daily.  It is very distressing to her.  She has heard about a cream that could work and would like to try that.        Today's PHQ-2 Score:   PHQ-2 ( 1999 [employer]) 9/7/2022   Q1: Little interest or pleasure in doing things 1   Q2: Feeling down, depressed or hopeless 1   PHQ-2 Score 2   Q1: Little interest or pleasure in  doing things Several days   Q2: Feeling down, depressed or hopeless Several days   PHQ-2 Score 2       Abuse: Current or Past (Physical, Sexual or Emotional) - No  Do you feel safe in your environment? Yes    Have you ever done Advance Care Planning? (For example, a Health Directive, POLST, or a discussion with a medical provider or your loved ones about your wishes): No, advance care planning information given to patient to review.  Advanced care planning was discussed at today's visit.    Social History     Tobacco Use     Smoking status: Former Smoker     Packs/day: 0.50     Years: 10.00     Pack years: 5.00     Types: Cigarettes     Start date: 4/9/2009     Quit date: 4/9/2019     Years since quitting: 3.4     Smokeless tobacco: Never Used   Substance Use Topics     Alcohol use: Not Currently     Comment: Once in awhile         Alcohol Use 9/7/2022   Prescreen: >3 drinks/day or >7 drinks/week? No   Prescreen: >3 drinks/day or >7 drinks/week? -       Reviewed orders with patient.  Reviewed health maintenance and updated orders accordingly - Yes      Breast Cancer Screening:    FHS-7:   Breast CA Risk Assessment (FHS-7) 9/7/2022   Did any of your first-degree relatives have breast or ovarian cancer? Yes   Did any of your relatives have bilateral breast cancer? Yes   Did any man in your family have breast cancer? No   Did any woman in your family have breast and ovarian cancer? Yes   Did any woman in your family have breast cancer before age 50 y? No   Do you have 2 or more relatives with breast and/or ovarian cancer? Yes   Do you have 2 or more relatives with breast and/or bowel cancer? Yes       Patient under 40 years of age: Routine Mammogram Screening not recommended.   Pertinent mammograms are reviewed under the imaging tab.    History of abnormal Pap smear: NO - age 21-29 PAP every 3 years recommended  PAP / HPV 4/2/2018 10/15/2014   PAP (Historical) NIL NIL     Reviewed and updated as needed this visit by  "clinical staff   Tobacco  Allergies  Meds  Problems  Med Hx  Surg Hx  Fam Hx            Reviewed and updated as needed this visit by Provider   Tobacco  Allergies  Meds  Problems  Med Hx  Surg Hx  Fam Hx           Past Medical History:   Diagnosis Date     Depression      DVT 3/23/2009    secondary to patch and smoking and Factor V mutation 2009, had course of coumadin and no relapse off birth control hormones.      DVT (deep venous thrombosis) (H)     Factor V Leiden mutation carrier     Labial cyst 02/01/2012      Past Surgical History:   Procedure Laterality Date     NO HISTORY OF SURGERY         Review of Systems   Constitutional: Negative for chills and fever.   HENT: Negative for congestion, ear pain, hearing loss and sore throat.    Eyes: Negative for pain and visual disturbance.   Respiratory: Positive for shortness of breath. Negative for cough.    Cardiovascular: Positive for chest pain. Negative for palpitations and peripheral edema.   Gastrointestinal: Negative for abdominal pain, constipation, diarrhea, heartburn, hematochezia and nausea.   Breasts:  Negative for tenderness, breast mass and discharge.   Genitourinary: Negative for dysuria, frequency, genital sores, hematuria, pelvic pain, urgency, vaginal bleeding and vaginal discharge.   Musculoskeletal: Negative for arthralgias, joint swelling and myalgias.   Skin: Negative for rash.   Neurological: Negative for dizziness, weakness, headaches and paresthesias.   Psychiatric/Behavioral: Negative for mood changes. The patient is not nervous/anxious.           OBJECTIVE:   /80 (BP Location: Right arm, Patient Position: Sitting, Cuff Size: Adult Regular)   Pulse 79   Ht 1.702 m (5' 7\")   Wt 92.5 kg (204 lb)   BMI 31.95 kg/m    Physical Exam  GENERAL: healthy, alert and no distress  EYES: Eyes grossly normal to inspection, PERRL and conjunctivae and sclerae normal  HENT: ear canals and TM's normal, nose and mouth without ulcers or " "lesions  NECK: no adenopathy, no asymmetry, masses, or scars and thyroid normal to palpation  RESP: lungs clear to auscultation - no rales, rhonchi or wheezes  CV: regular rate and rhythm, normal S1 S2, no S3 or S4, no murmur, click or rub, no peripheral edema and peripheral pulses strong  ABDOMEN: soft, nontender, no hepatosplenomegaly, no masses and bowel sounds normal  MS: no gross musculoskeletal defects noted, no edema  SKIN: no suspicious lesions or rashes, patient does have coarse hair present on lower chin diffusely  NEURO: Normal strength and tone, mentation intact and speech normal  PSYCH: mentation appears normal, affect normal/bright        ASSESSMENT/PLAN:   (Z00.00) Well adult exam  (primary encounter diagnosis)  Comment: Stable  Plan: Prenatal counseling given.  Patient will take prenatal vitamin every day.  Had Pap smear January 2021 at [facility] so does not need one done today.  No other screening needed at this time.  We will follow-up with OB/GYN up on positive pregnancy test to discuss increased risk due to factor V Leiden    (D68.51) Factor V Leiden mutation (H)  History of prior DVT, will plan on follow-up with OB/GYN upon positive pregnancy test to discuss what anticoagulation plan will be.    (L67.8) Abnormal facial hair  Comment: Significant facial hair present but not other signs of hormonal abnormality, given pursuit of pregnancy topical treatment is best plan  Plan: eflornithine HCl (VANIQA) 13.9 % CREA        Patient given prescription for topical cream.  Discussed that it may or may not be covered well by insurance.  Could also consider electrolysis or laser treatment.          COUNSELING:  Reviewed preventive health counseling, as reflected in patient instructions       Regular exercise       Healthy diet/nutrition       Immunizations    Estimated body mass index is 31.95 kg/m  as calculated from the following:    Height as of this encounter: 1.702 m (5' 7\").    Weight as of this " encounter: 92.5 kg (204 lb).    Weight management plan: Discussed healthy diet and exercise guidelines    She reports that she quit smoking about 3 years ago. Her smoking use included cigarettes. She started smoking about 13 years ago. She has a 5.00 pack-year smoking history. She has never used smokeless tobacco.      Counseling Resources:  ATP IV Guidelines  Pooled Cohorts Equation Calculator  Breast Cancer Risk Calculator  BRCA-Related Cancer Risk Assessment: FHS-7 Tool  FRAX Risk Assessment  ICSI Preventive Guidelines  Dietary Guidelines for Americans, 2010  USDA's MyPlate  ASA Prophylaxis  Lung CA Screening    Jessica Mi MD  [facility]

## 2022-09-08 ASSESSMENT — ENCOUNTER SYMPTOMS
DYSURIA: 0
EYE PAIN: 0
WEAKNESS: 0
FEVER: 0
CHILLS: 0
SHORTNESS OF BREATH: 1
HEARTBURN: 0
PALPITATIONS: 0
MYALGIAS: 0
HEMATOCHEZIA: 0
DIARRHEA: 0
ARTHRALGIAS: 0
SORE THROAT: 0
JOINT SWELLING: 0
ABDOMINAL PAIN: 0
NAUSEA: 0
NERVOUS/ANXIOUS: 0
HEMATURIA: 0
HEADACHES: 0
BREAST MASS: 0
PARESTHESIAS: 0
CONSTIPATION: 0
DIZZINESS: 0
COUGH: 0
FREQUENCY: 0

## 2023-10-30 ENCOUNTER — OFFICE VISIT (OUTPATIENT)
Dept: FAMILY MEDICINE | Facility: CLINIC | Age: 28
End: 2023-10-30
Payer: COMMERCIAL

## 2023-10-30 VITALS
WEIGHT: 210 LBS | BODY MASS INDEX: 32.96 KG/M2 | DIASTOLIC BLOOD PRESSURE: 64 MMHG | TEMPERATURE: 98.9 F | SYSTOLIC BLOOD PRESSURE: 116 MMHG | OXYGEN SATURATION: 99 % | HEART RATE: 69 BPM | HEIGHT: 67 IN

## 2023-10-30 DIAGNOSIS — R05.1 ACUTE COUGH: Primary | ICD-10-CM

## 2023-10-30 PROCEDURE — 99213 OFFICE O/P EST LOW 20 MIN: CPT | Performed by: NURSE PRACTITIONER

## 2023-10-30 RX ORDER — BENZONATATE 200 MG/1
200 CAPSULE ORAL 3 TIMES DAILY PRN
Qty: 30 CAPSULE | Refills: 0 | Status: SHIPPED | OUTPATIENT
Start: 2023-10-30 | End: 2024-02-02

## 2023-10-30 ASSESSMENT — ENCOUNTER SYMPTOMS: COUGH: 1

## 2023-10-30 NOTE — PROGRESS NOTES
"  Assessment & Plan     (R05.1) Acute cough  (primary encounter diagnosis)  Comment:   Plan: benzonatate (TESSALON) 200 MG capsule        Also advised OTC antihistamine and lozengers, rtc 2-3 weeks recheck if not resolved, sooner if needed             BMI:   Estimated body mass index is 32.89 kg/m  as calculated from the following:    Height as of this encounter: 1.702 m (5' 7\").    Weight as of this encounter: 95.3 kg (210 lb).           Key Hernandez NP  [facility]    Cyrus Mccormick is a 28 year old, presenting for the following health issues: has had cough x2 weeks, dry cough, denies any other symptoms, no fever, has not done any COVID tests    Using Robitussin and now using Mucinex.  Has a hx of allergies, sometimes rain triggers this and we have a had a lot of rain.  No hx of asthma  She does a lot of talking at work  No new meds  No fever      Cough        10/30/2023     5:20 PM   Additional Questions   Roomed by catarino glover   Accompanied by self         Cough    History of Present Illness       Reason for visit:  Persistent cough  Symptom onset:  1-2 weeks ago  Symptoms include:  Cough  Symptom intensity:  Moderate  Symptom progression:  Improving  Had these symptoms before:  No  What makes it worse:  Lying down coughing gets worse  What makes it better:  Medicine. Hot shower    She eats 2-3 servings of fruits and vegetables daily.She consumes 0 sweetened beverage(s) daily.She exercises with enough effort to increase her heart rate 30 to 60 minutes per day.  She exercises with enough effort to increase her heart rate 3 or less days per week.   She is taking medications regularly.             Review of Systems   Respiratory:  Positive for cough.             Objective    /64 (BP Location: Right arm, Patient Position: Sitting)   Pulse 69   Temp 98.9  F (37.2  C)   Ht 1.702 m (5' 7\")   Wt 95.3 kg (210 lb)   LMP 10/09/2023 (Approximate)   SpO2 99%   Breastfeeding No  "  BMI 32.89 kg/m    Body mass index is 32.89 kg/m .  Physical Exam   GENERAL: healthy, alert and no distress  EYES: Eyes grossly normal to inspection, PERRL and conjunctivae and sclerae normal  HENT: ear canals and TM's normal, nose and mouth without ulcers or lesions  NECK: no adenopathy, no asymmetry, masses, or scars and thyroid normal to palpation  RESP: lungs clear to auscultation - no rales, rhonchi or wheezes  CV: regular rate and rhythm, normal S1 S2, no S3 or S4, no murmur, click or rub, no peripheral edema and peripheral pulses strong  MS: no gross musculoskeletal defects noted, no edema

## 2023-11-10 ENCOUNTER — VIRTUAL VISIT (OUTPATIENT)
Dept: FAMILY MEDICINE | Facility: CLINIC | Age: 28
End: 2023-11-10
Payer: COMMERCIAL

## 2023-11-10 DIAGNOSIS — J18.9 WALKING PNEUMONIA: Primary | ICD-10-CM

## 2023-11-10 PROCEDURE — 99213 OFFICE O/P EST LOW 20 MIN: CPT | Mod: VID | Performed by: PHYSICIAN ASSISTANT

## 2023-11-10 RX ORDER — DOXYCYCLINE HYCLATE 100 MG
100 TABLET ORAL 2 TIMES DAILY
Qty: 20 TABLET | Refills: 0 | Status: SHIPPED | OUTPATIENT
Start: 2023-11-10 | End: 2024-02-02

## 2023-11-10 ASSESSMENT — ENCOUNTER SYMPTOMS
CONSTITUTIONAL NEGATIVE: 1
RHINORRHEA: 1
SINUS PRESSURE: 0
SINUS PAIN: 0
COUGH: 1
CARDIOVASCULAR NEGATIVE: 1
CHEST TIGHTNESS: 1

## 2023-11-10 NOTE — PROGRESS NOTES
"Anny is a 28 year old who is being evaluated via a billable video visit.      How would you like to obtain your AVS? MyChart  If the video visit is dropped, the invitation should be resent by: Text to cell phone: [phone]  Will anyone else be joining your video visit? No          Assessment & Plan     Walking pneumonia  Should see improvement by the middle of next week but resolution of cough may take an additional 2 weeks if she develops fever significant tightness of chest or shortness of breath she should be seen in person  - doxycycline hyclate (VIBRA-TABS) 100 MG tablet  Dispense: 20 tablet; Refill: 0               BMI:   Estimated body mass index is 32.89 kg/m  as calculated from the following:    Height as of 10/30/23: 1.702 m (5' 7\").    Weight as of 10/30/23: 95.3 kg (210 lb).           NAMRATA Cuellar  [facility]    Subjective   Anny is a 28 year old, presenting for the following health issues:  Cough (With mucus, for the past 4 weeks, now has wheezing/SOB) and Nasal Congestion        11/10/2023     1:41 PM   Additional Questions   Roomed by JUVENTINO Mckinney       28-year-old presents virtually for ongoing cough is now been a month now productive of mucopurulent sputum some colored rhinorrhea no fever no feels tightness in the chest she can feel a rattle  Initially the cough was dry and nonproductive most likely a viral URI at onset    Cough  Associated symptoms include rhinorrhea.        Acute Illness  Acute illness concerns: cough with mucus  Onset/Duration: 4 weeks  Symptoms:  Fever: No  Chills/Sweats: No  Headache (location?): YES  Sinus Pressure: YES  Conjunctivitis:  No  Ear Pain: no  Rhinorrhea: YES  Congestion: YES  Sore Throat: No  Cough: YES-  Wheeze: No  Decreased Appetite: No  Nausea: No  Vomiting: No  Diarrhea: No  Dysuria/Freq.: No  Dysuria or Hematuria: No  Fatigue/Achiness: No  Sick/Strep Exposure: No  Therapies tried and outcome: Mucinex, allergy " medication        Review of Systems   Constitutional: Negative.    HENT:  Positive for congestion and rhinorrhea. Negative for sinus pressure and sinus pain.    Respiratory:  Positive for cough and chest tightness.    Cardiovascular: Negative.             Objective           Vitals:  No vitals were obtained today due to virtual visit.    Physical Exam   GENERAL: Healthy, alert and no distress  EYES: Eyes grossly normal to inspection.  No discharge or erythema, or obvious scleral/conjunctival abnormalities.  RESP: No audible wheeze, cough, or visible cyanosis.  No visible retractions or increased work of breathing.    SKIN: Visible skin clear. No significant rash, abnormal pigmentation or lesions.  NEURO: Cranial nerves grossly intact.  Mentation and speech appropriate for age.  PSYCH: Mentation appears normal, affect normal/bright, judgement and insight intact, normal speech and appearance well-groomed.                Video-Visit Details    Type of service:  Video Visit     Originating Location (pt. Location): Home    Distant Location (provider location):  On-site  Platform used for Video Visit: Darnell

## 2024-02-02 ENCOUNTER — OFFICE VISIT (OUTPATIENT)
Dept: FAMILY MEDICINE | Facility: CLINIC | Age: 29
End: 2024-02-02
Payer: COMMERCIAL

## 2024-02-02 VITALS
OXYGEN SATURATION: 98 % | WEIGHT: 216.2 LBS | SYSTOLIC BLOOD PRESSURE: 116 MMHG | HEART RATE: 94 BPM | BODY MASS INDEX: 33.93 KG/M2 | TEMPERATURE: 98.4 F | DIASTOLIC BLOOD PRESSURE: 60 MMHG | HEIGHT: 67 IN

## 2024-02-02 DIAGNOSIS — R19.02 LEFT UPPER QUADRANT ABDOMINAL MASS: Primary | ICD-10-CM

## 2024-02-02 PROCEDURE — 99213 OFFICE O/P EST LOW 20 MIN: CPT | Performed by: NURSE PRACTITIONER

## 2024-02-02 ASSESSMENT — ANXIETY QUESTIONNAIRES
2. NOT BEING ABLE TO STOP OR CONTROL WORRYING: NOT AT ALL
5. BEING SO RESTLESS THAT IT IS HARD TO SIT STILL: NOT AT ALL
GAD7 TOTAL SCORE: 0
3. WORRYING TOO MUCH ABOUT DIFFERENT THINGS: NOT AT ALL
1. FEELING NERVOUS, ANXIOUS, OR ON EDGE: NOT AT ALL
7. FEELING AFRAID AS IF SOMETHING AWFUL MIGHT HAPPEN: NOT AT ALL
IF YOU CHECKED OFF ANY PROBLEMS ON THIS QUESTIONNAIRE, HOW DIFFICULT HAVE THESE PROBLEMS MADE IT FOR YOU TO DO YOUR WORK, TAKE CARE OF THINGS AT HOME, OR GET ALONG WITH OTHER PEOPLE: NOT DIFFICULT AT ALL
6. BECOMING EASILY ANNOYED OR IRRITABLE: NOT AT ALL
GAD7 TOTAL SCORE: 0
7. FEELING AFRAID AS IF SOMETHING AWFUL MIGHT HAPPEN: NOT AT ALL
8. IF YOU CHECKED OFF ANY PROBLEMS, HOW DIFFICULT HAVE THESE MADE IT FOR YOU TO DO YOUR WORK, TAKE CARE OF THINGS AT HOME, OR GET ALONG WITH OTHER PEOPLE?: NOT DIFFICULT AT ALL
4. TROUBLE RELAXING: NOT AT ALL

## 2024-02-02 NOTE — PROGRESS NOTES
"  Assessment & Plan     (R19.02) Left upper quadrant abdominal mass  (primary encounter diagnosis)  Comment: Consistent with lipoma but given the fact that it is a little large with some irregular borders and causes her worry, we will get an ultrasound.  She is agreeable to plan  Plan: US Abdomen Limited                    BMI  Estimated body mass index is 33.86 kg/m  as calculated from the following:    Height as of this encounter: 1.702 m (5' 7\").    Weight as of this encounter: 98.1 kg (216 lb 3.2 oz).             Cyrus Mccormick is a 28 year old, presenting for the following health issues: has a lump on her left rib cage, present for over 1 year, no change in size, only sometimes does it feel irritated    No pain, it is more noticeable when sitting. Sometimes achey. Noticed it fall of 2022  Mass        2/2/2024    11:14 AM   Additional Questions   Roomed by catarino glover   Accompanied by self     History of Present Illness       Reason for visit:  Pain on ribcage left side  Symptom onset:  More than a month    She eats 0-1 servings of fruits and vegetables daily.She consumes 0 sweetened beverage(s) daily.She exercises with enough effort to increase her heart rate 30 to 60 minutes per day.  She exercises with enough effort to increase her heart rate 3 or less days per week.   She is taking medications regularly.     GAD7 score: 0               Objective    /60 (BP Location: Right arm, Patient Position: Sitting)   Pulse 94   Temp 98.4  F (36.9  C)   Ht 1.702 m (5' 7\")   Wt 98.1 kg (216 lb 3.2 oz)   LMP 01/08/2024 (Approximate)   SpO2 98%   Breastfeeding No   BMI 33.86 kg/m    Body mass index is 33.86 kg/m .  Physical Exam   GENERAL: alert and no distress  EYES: Eyes grossly normal to inspection, PERRL and conjunctivae and sclerae normal  NECK: no adenopathy, no asymmetry, masses, or scars  RESP: lungs clear to auscultation - no rales, rhonchi or wheezes  CV: regular rate and rhythm, normal S1 S2, " no S3 or S4, no murmur, click or rub, no peripheral edema  ABDOMEN: soft, nontender, no hepatosplenomegaly, no masses and bowel sounds normal.  Unable to palpate the area of concern when she is sitting best although it is palpable and lying.  It is in the left upper quadrant in the subcutaneous tissue and fairly mobile but borders are little irregular, I would estimate 1-1/2 cm x 1 cm in size right below the left rib cage centrally located.  No pain and no erythema  MS: no gross musculoskeletal defects noted, no edema            Signed Electronically by: Key Hernandez, NP

## 2024-02-19 ENCOUNTER — HOSPITAL ENCOUNTER (OUTPATIENT)
Dept: ULTRASOUND IMAGING | Facility: CLINIC | Age: 29
Discharge: HOME | End: 2024-02-19
Attending: NURSE PRACTITIONER | Admitting: NURSE PRACTITIONER
Payer: COMMERCIAL

## 2024-02-19 DIAGNOSIS — R19.02 LEFT UPPER QUADRANT ABDOMINAL MASS: ICD-10-CM

## 2024-02-19 PROCEDURE — 76705 ECHO EXAM OF ABDOMEN: CPT

## 2024-04-29 ENCOUNTER — TELEPHONE (OUTPATIENT)
Dept: FAMILY MEDICINE | Facility: CLINIC | Age: 29
End: 2024-04-29
Payer: COMMERCIAL

## 2024-04-29 ENCOUNTER — MYC MEDICAL ADVICE (OUTPATIENT)
Dept: FAMILY MEDICINE | Facility: CLINIC | Age: 29
End: 2024-04-29
Payer: COMMERCIAL

## 2024-04-29 NOTE — TELEPHONE ENCOUNTER
I am happy to see her tomorrow morning at 7:15 check in to evaluate.  If she feels strongly this is a blood clot that needs immediate attention, she should use the ER now.

## 2024-04-29 NOTE — TELEPHONE ENCOUNTER
Please see previous encounter with [employer] message. Pt is requesting to get an order placed for an ultrasound of her right leg, upper thigh. She would like an ultrasound to be placed if possible. Please call pt back with any questions or if she should come in.

## 2024-04-30 ENCOUNTER — OFFICE VISIT (OUTPATIENT)
Dept: FAMILY MEDICINE | Facility: CLINIC | Age: 29
End: 2024-04-30
Payer: COMMERCIAL

## 2024-04-30 ENCOUNTER — OFFICE VISIT (OUTPATIENT)
Dept: PEDIATRICS | Facility: CLINIC | Age: 29
End: 2024-04-30
Payer: COMMERCIAL

## 2024-04-30 ENCOUNTER — HOSPITAL ENCOUNTER (OUTPATIENT)
Dept: ULTRASOUND IMAGING | Facility: HOSPITAL | Age: 29
Discharge: HOME | End: 2024-04-30
Attending: EMERGENCY MEDICINE | Admitting: EMERGENCY MEDICINE
Payer: COMMERCIAL

## 2024-04-30 VITALS
BODY MASS INDEX: 34.58 KG/M2 | WEIGHT: 220.3 LBS | HEART RATE: 61 BPM | RESPIRATION RATE: 16 BRPM | SYSTOLIC BLOOD PRESSURE: 106 MMHG | OXYGEN SATURATION: 96 % | DIASTOLIC BLOOD PRESSURE: 64 MMHG | TEMPERATURE: 98 F | HEIGHT: 67 IN

## 2024-04-30 VITALS
HEART RATE: 70 BPM | TEMPERATURE: 98.1 F | WEIGHT: 220.8 LBS | SYSTOLIC BLOOD PRESSURE: 116 MMHG | BODY MASS INDEX: 34.58 KG/M2 | DIASTOLIC BLOOD PRESSURE: 81 MMHG | OXYGEN SATURATION: 100 % | RESPIRATION RATE: 16 BRPM

## 2024-04-30 DIAGNOSIS — S80.11XA HEMATOMA OF LEG, RIGHT, INITIAL ENCOUNTER: Primary | ICD-10-CM

## 2024-04-30 DIAGNOSIS — S80.12XA HEMATOMA OF LEG, LEFT, INITIAL ENCOUNTER: ICD-10-CM

## 2024-04-30 DIAGNOSIS — D68.51 FACTOR V LEIDEN MUTATION (H): Primary | ICD-10-CM

## 2024-04-30 DIAGNOSIS — M79.651 PAIN OF RIGHT THIGH: ICD-10-CM

## 2024-04-30 PROCEDURE — 99213 OFFICE O/P EST LOW 20 MIN: CPT | Performed by: EMERGENCY MEDICINE

## 2024-04-30 PROCEDURE — 93971 EXTREMITY STUDY: CPT | Mod: RT

## 2024-04-30 PROCEDURE — 99207 REFERRAL TO ACUTE AND DIAGNOSTIC SERVICES: CPT | Performed by: NURSE PRACTITIONER

## 2024-04-30 ASSESSMENT — PAIN SCALES - GENERAL: PAINLEVEL: NO PAIN (0)

## 2024-04-30 ASSESSMENT — ENCOUNTER SYMPTOMS: LEG PAIN: 1

## 2024-04-30 NOTE — PROGRESS NOTES
Assessment & Plan     (D68.51) Factor V Leiden mutation (H24)  (primary encounter diagnosis)  Comment: diagnosed age 13 while smoking and on OCPs.  Has had DVT at that time and a superfical phlebitis in her left arm 1 year ago after a miscarriage treated with Lovenox.  Currently does not use any routine anti coag but did take one baby asa the lat two day  Plan:     (P53.755) Pain of right thigh  Comment: was tubing on a water slide 10 days ago and the plastic handle jolted the back of right thigh and caused great pain and subsequent swelling in the back of right upper thigh with great bruising. Pain persists though bruise is improving. Feels similar to episode of clot in left arm 1 year ago. Worried about recurrent blood clot, hx of DVT age 13 year. Denies SOB or swelling in right ankle or pain in right calf  Plan:     Referral to Acute and Diagnostic Services    [phone] ([place]) [address]  [address]    Transition to Acute & Diagnostic Services Clinic has been discussed with patient, and she agrees with next level of care.   Patient understands that evaluation/treatment at [facility] typically takes significantly longer than in clinic/urgent care (>2 hours).  The [facility] has been contacted by provider/staff to confirm patient acceptance.         Special issues:      None  Worrisome for DVT right leg, consider ultrasound   I was not able to reach anyone at the [facility] at 7:45 this morning, likely they are not open yet. I will keep trying to call but please call her when you get this order, she is anticipating going to the [facility] this morning (4/30) for evaluation but needs a time, thanks.                     The following provider has assessed this patient for intervention at [facility], and directed the patient for referral: Key Hernandez NP                       BMI  Estimated body mass index is 34.5 kg/m  as  "calculated from the following:    Height as of this encounter: 1.702 m (5' 7\").    Weight as of this encounter: 99.9 kg (220 lb 4.8 oz).             Subjective   Anny is a 29 year old, presenting for the following health issues: patient has concern about possible DVT in right leg, significant bruising of upper thigh, hurt leg while sliding down a water slide - 4/20/24    The longitudinal plan of care for the diagnosis(es)/condition(s) as documented were addressed during this visit. Due to the added complexity in care, I will continue to support Anny in the subsequent management and with ongoing continuity of care.    Deep Vein Thrombosis and Leg Pain        4/30/2024     7:04 AM   Additional Questions   Roomed by catarino glover   Accompanied by self     Leg Pain    History of Present Illness       Reason for visit:  Bad bruise  clotting concerns  Symptom onset:  1-3 days ago    She eats 0-1 servings of fruits and vegetables daily.She consumes 0 sweetened beverage(s) daily.She exercises with enough effort to increase her heart rate 30 to 60 minutes per day.  She exercises with enough effort to increase her heart rate 3 or less days per week.   She is taking medications regularly.                 Objective    /64 (BP Location: Right arm, Patient Position: Sitting)   Pulse 61   Temp 98  F (36.7  C)   Resp 16   Ht 1.702 m (5' 7\")   Wt 99.9 kg (220 lb 4.8 oz)   LMP 04/25/2024 (Exact Date)   SpO2 96%   Breastfeeding No   BMI 34.50 kg/m    Body mass index is 34.5 kg/m .  Physical Exam   GENERAL: alert and no distress  NECK: no adenopathy, no asymmetry, masses, or scars  RESP: lungs clear to auscultation - no rales, rhonchi or wheezes  CV: regular rate and rhythm, normal S1 S2, no S3 or S4, no murmur, click or rub, no peripheral edema  MS: no gross musculoskeletal defects noted, no edema  Bruising lateral right upper thigh approx 5 cm diameter firm and tender, difficulty palpation right inguinal and " poplitea pulses, no ankle edema            Signed Electronically by: Key Hernandez NP     negative...

## 2024-04-30 NOTE — PROGRESS NOTES
Acute [facility] Diagnostic Services Clinic Visit    Nursing triage note:    Cyrus Mccormick is a 29 year old, presenting for the following health issues:  Leg Pain (Upper right leg, behind the thigh. Minor Swelling.)        Objective    /81 (BP Location: Right arm, Patient Position: Sitting, Cuff Size: Adult Large)   Pulse 70   Temp 98.1  F (36.7  C) (Oral)   Resp 16   Wt 100.2 kg (220 lb 12.8 oz)   LMP 04/25/2024 (Exact Date)   SpO2 100%   BMI 34.58 kg/m    Body mass index is 34.58 kg/m .          [facility] PROVIDER NOTE  Formerly [facility]      History     Chief Complaint   Patient presents with    Leg Pain     Upper right leg, behind the thigh. Minor Swelling.     HPI  Anny Oconnell is a 29 year old female with a history of factor V Leiden and previous DVTs who states that she was in a water slide activity and during which sustained an injury to the back of her right thigh.  Patient states that something gouged her in her right thigh causing a bruise to her hamstring area in the posterior aspect of her right thigh.  Because of her factor V Leiden the patient is concerned that this may turn into a DVT and she presents to the [facility] for evaluation.  Patient denies any ankle swelling denies any chest pain or shortness of breath and states that the last time she had a sensation like she does in her right posterior thigh she had a DVT in her left arm.    I have reviewed the Medications, Allergies, Past Medical and Surgical History, and Social History in the [facility] system.    Past Medical History:   Diagnosis Date    Depression     DVT 3/23/2009    secondary to patch and smoking and Factor V mutation 2009, had course of coumadin and no relapse off birth control hormones.     DVT (deep venous thrombosis) (H)     Factor V Leiden mutation carrier    Labial cyst 02/01/2012       Past Surgical History:   Procedure Laterality Date    NO HISTORY OF SURGERY           Dose / Directions   valACYclovir 1000 mg  tablet  Commonly known as: VALTREX      Dose: 1 tablet  Take 1 tablet by mouth daily  Refills: 0              Past medical history, past surgical history, medications, and allergies were reviewed with the patient. Additional pertinent items: Factor V Leiden    Family History   Problem Relation Age of Onset    Breast Cancer Maternal Grandmother     Cancer Maternal Grandmother         lung    Cancer Maternal Grandfather     Cancer - colorectal Paternal Grandmother     Colon Cancer Paternal Grandmother     Blood Disease Mother         mother with Factor V Leiden deficiency    Pulmonary Embolism Mother 51    Breast Cancer Mother     Depression Father     Anxiety Disorder Father     Mental Illness Father         Bi-Polar Disorder       Social History     Tobacco Use    Smoking status: Former     Current packs/day: 0.00     Average packs/day: 0.5 packs/day for 10.0 years (5.0 ttl pk-yrs)     Types: Cigarettes     Start date: 2009     Quit date: 2019     Years since quittin.0     Passive exposure: Past    Smokeless tobacco: Never   Substance Use Topics    Alcohol use: Not Currently     Comment: Once in awhile     Social history was reviewed with the patient. Additional pertinent items: None    Allergies   Allergen Reactions    Estrogen [Estrogenic Substance]      Multiple Blood clots.        Review of Systems  A medically appropriate review of systems was performed with pertinent positives and negatives noted in the HPI, and all other systems negative.    Physical Exam   BP: 116/81  Pulse: 70  Temp: 98.1  F (36.7  C)  Resp: 16  Weight: 100.2 kg (220 lb 12.8 oz)  SpO2: 100 %      Physical Exam  Vitals and nursing note reviewed.   Constitutional:       General: She is not in acute distress.     Comments: Clothed and ambulatory without difficulty   HENT:      Head: Atraumatic.   Eyes:      Extraocular Movements: Extraocular movements intact.      Pupils: Pupils are equal, round, and reactive to light.   Pulmonary:       Effort: No respiratory distress.   Musculoskeletal:      Comments: Patient's right lower extremity reveals no significant pretibial or ankle swelling and reveals no Homans' sign.   Neurological:      General: No focal deficit present.      Mental Status: She is alert and oriented to person, place, and time.   Psychiatric:         Mood and Affect: Mood normal.         ED Course     Orders Placed This Encounter   Procedures    US Lower Extremity Venous Duplex Right       Procedures         EXAM: US LOWER EXTREMITY VENOUS DUPLEX RIGHT  LOCATION: [facility]  DATE: 4/30/2024     INDICATION:  While the request states left leg hematoma, the EPIC note from Dr. Gutiérrez describes injury of the right leg while on a water slide with a bruise. History of factor V Leiden previous DVTs.  COMPARISON: None.  TECHNIQUE: Venous Duplex ultrasound of the right lower extremity with and without compression, augmentation and duplex. Color flow and spectral Doppler with waveform analysis performed.     FINDINGS: Exam includes the common femoral, femoral, popliteal, and contralateral common femoral veins as well as segmentally visualized deep calf veins and greater saphenous vein.      RIGHT: No deep vein thrombosis. No superficial thrombophlebitis. No popliteal cyst.     In the area of the visible bruising, subcutaneous fat is hyperechoic without a discrete hematoma or fluid collection.                                                                      IMPRESSION:  1.  No deep venous thrombosis in the right leg.  2.  Hemorrhagic/hyperechoic areas seen within the subcutaneous fat in the inferior lateral right thigh without a discrete hematoma.      Critical care was not performed.     Medical Decision Making  The patient's presentation was of low complexity (an acute and uncomplicated illness or injury).    The patient's evaluation involved:  ordering and/or review of 1 test(s) in this encounter (see  above)    The patient's management necessitated only low risk treatment.      Assessments & Plan (with Medical Decision Making)     I have reviewed the nursing notes.    Patient was reassured there was no DVT of her leg and at this time will be sent home with conservative management    I have reviewed the findings, diagnosis, plan and need for follow up with the patient.        Final diagnoses:   Hematoma of leg, left, initial encounter     Your ultrasound reveals no evidence of DVT.    You may use alternating cold and warm compresses to the area of injury to help promote reabsorption of the blood underneath the skin    You may use Tylenol or Advil as needed for discomfort    Routine discharge instructions were given for this diagnosis    Follow-up with primary care as needed    MORIS BUCK MD    4/30/2024   [facility]

## 2024-04-30 NOTE — PATIENT INSTRUCTIONS
Your ultrasound reveals no evidence of DVT.    You may use alternating cold and warm compresses to the area of injury to help promote reabsorption of the blood underneath the skin    You may use Tylenol or Advil as needed for discomfort

## 2024-06-11 ENCOUNTER — MYC MEDICAL ADVICE (OUTPATIENT)
Dept: FAMILY MEDICINE | Facility: CLINIC | Age: 29
End: 2024-06-11
Payer: COMMERCIAL

## 2024-06-11 NOTE — TELEPHONE ENCOUNTER
Please see My Chart Message: patient requesting to re-start on BCP.    Last OV: 4/30/24 Deep Vein Thrombosis  Leg Pain  Reason for Visit    Last Well Adult Exam: 9/7/22

## 2024-06-27 ENCOUNTER — MYC MEDICAL ADVICE (OUTPATIENT)
Dept: FAMILY MEDICINE | Facility: CLINIC | Age: 29
End: 2024-06-27
Payer: COMMERCIAL

## 2024-06-27 ENCOUNTER — TRANSCRIBE ORDERS (OUTPATIENT)
Dept: OTHER | Age: 29
End: 2024-06-27

## 2024-06-27 ENCOUNTER — TELEPHONE (OUTPATIENT)
Dept: FAMILY MEDICINE | Facility: CLINIC | Age: 29
End: 2024-06-27

## 2024-06-27 DIAGNOSIS — D68.51 FACTOR V LEIDEN (H): Primary | ICD-10-CM

## 2024-06-27 NOTE — TELEPHONE ENCOUNTER
S-(situation):   Patient is calling for appointment ASAP    B-(background):   Patient with Factor V   Patient newly pregnant    Patient had E-visit:  06/27/2024 E-Visit  [facility]     Ordered Prescriptions  Prescription Sig Dispensed Refills Start Date End Date  enoxaparin (LOVENOX) 40 MG/0.4ML prefilled syringe   Indications: Superficial thrombophlebitis of left upper extremity, Factor 5 Leiden mutation, heterozygous ([facility]), History of thrombophlebitis, Thrombosis affecting pregnancy, antepartum Inject 0.4 mL (40 mg) subcutaneously daily. 30 Each  7 06/27/2024      A-(assessment):   RN noted that orders placed from E-visit today with Jyoti.  PATIENT read response while on phone.      R-(recommendations):   Patient will follow Jyoti' instructions.    Patient is scheduled with OB:  7/15/2024 6:15 PM [place] [facility] NURSE EDUCATION [facility] [place]    Patient instructed to call back if symptoms change or if new symptoms present. Patient verbalizes agreement with plan.    Triage RN  [place], [place]

## 2024-06-28 NOTE — TELEPHONE ENCOUNTER
Noted. Agree with plan for injections as ordered. Has appointment with Key Hernandez next week and OB on 7/15.

## 2025-06-17 NOTE — ED NOTES
left calf pain for 2 days, this am woke with chest pains, hx of factor 5 , and blood clots   Strong peripheral pulses